# Patient Record
Sex: FEMALE | NOT HISPANIC OR LATINO
[De-identification: names, ages, dates, MRNs, and addresses within clinical notes are randomized per-mention and may not be internally consistent; named-entity substitution may affect disease eponyms.]

---

## 2017-01-23 ENCOUNTER — APPOINTMENT (OUTPATIENT)
Dept: OBGYN | Facility: CLINIC | Age: 37
End: 2017-01-23

## 2017-01-23 VITALS
SYSTOLIC BLOOD PRESSURE: 116 MMHG | DIASTOLIC BLOOD PRESSURE: 78 MMHG | BODY MASS INDEX: 20.41 KG/M2 | WEIGHT: 124 LBS | HEIGHT: 65.5 IN

## 2017-01-23 LAB
FETAL HEART DESCRIPTION: NORMAL
FETAL MOVEMENT: PRESENT
OB COMMENTS: NORMAL

## 2017-02-13 ENCOUNTER — APPOINTMENT (OUTPATIENT)
Dept: OBGYN | Facility: CLINIC | Age: 37
End: 2017-02-13

## 2017-02-13 VITALS
WEIGHT: 124 LBS | HEIGHT: 65.5 IN | DIASTOLIC BLOOD PRESSURE: 76 MMHG | BODY MASS INDEX: 20.41 KG/M2 | SYSTOLIC BLOOD PRESSURE: 123 MMHG

## 2017-02-13 LAB — FETAL HEART DESCRIPTION: NORMAL

## 2017-02-14 ENCOUNTER — ASOB RESULT (OUTPATIENT)
Age: 37
End: 2017-02-14

## 2017-02-14 ENCOUNTER — APPOINTMENT (OUTPATIENT)
Dept: ANTEPARTUM | Facility: CLINIC | Age: 37
End: 2017-02-14

## 2017-02-14 LAB
BASOPHILS # BLD AUTO: 0.02 K/UL
BASOPHILS NFR BLD AUTO: 0.3 %
EOSINOPHIL # BLD AUTO: 0.08 K/UL
EOSINOPHIL NFR BLD AUTO: 1 %
GLUCOSE 1H P 50 G GLC PO SERPL-MCNC: 89 MG/DL
HCT VFR BLD CALC: 35.2 %
HGB BLD-MCNC: 11.5 G/DL
HIV1+2 AB SPEC QL IA.RAPID: NONREACTIVE
IMM GRANULOCYTES NFR BLD AUTO: 0.5 %
LYMPHOCYTES # BLD AUTO: 2.08 K/UL
LYMPHOCYTES NFR BLD AUTO: 27.2 %
MAN DIFF?: NORMAL
MCHC RBC-ENTMCNC: 30.6 PG
MCHC RBC-ENTMCNC: 32.7 GM/DL
MCV RBC AUTO: 93.6 FL
MONOCYTES # BLD AUTO: 0.35 K/UL
MONOCYTES NFR BLD AUTO: 4.6 %
NEUTROPHILS # BLD AUTO: 5.08 K/UL
NEUTROPHILS NFR BLD AUTO: 66.4 %
PLATELET # BLD AUTO: 201 K/UL
RBC # BLD: 3.76 M/UL
RBC # FLD: 12.7 %
WBC # FLD AUTO: 7.65 K/UL

## 2017-03-06 ENCOUNTER — APPOINTMENT (OUTPATIENT)
Dept: OBGYN | Facility: CLINIC | Age: 37
End: 2017-03-06

## 2017-03-06 VITALS
HEIGHT: 65.5 IN | SYSTOLIC BLOOD PRESSURE: 106 MMHG | DIASTOLIC BLOOD PRESSURE: 71 MMHG | BODY MASS INDEX: 20.34 KG/M2 | WEIGHT: 123.6 LBS

## 2017-03-06 LAB
FETAL HEART DESCRIPTION: NORMAL
FETAL MOVEMENT: PRESENT

## 2017-03-21 ENCOUNTER — APPOINTMENT (OUTPATIENT)
Dept: OBGYN | Facility: CLINIC | Age: 37
End: 2017-03-21

## 2017-03-21 VITALS
HEIGHT: 65.5 IN | SYSTOLIC BLOOD PRESSURE: 116 MMHG | WEIGHT: 124 LBS | DIASTOLIC BLOOD PRESSURE: 72 MMHG | BODY MASS INDEX: 20.41 KG/M2

## 2017-03-21 LAB
FETAL HEART DESCRIPTION: NORMAL
FETAL MOVEMENT: PRESENT

## 2017-03-22 ENCOUNTER — APPOINTMENT (OUTPATIENT)
Dept: ANTEPARTUM | Facility: CLINIC | Age: 37
End: 2017-03-22

## 2017-03-22 ENCOUNTER — ASOB RESULT (OUTPATIENT)
Age: 37
End: 2017-03-22

## 2017-04-04 ENCOUNTER — APPOINTMENT (OUTPATIENT)
Dept: OBGYN | Facility: CLINIC | Age: 37
End: 2017-04-04

## 2017-04-04 VITALS
BODY MASS INDEX: 20.91 KG/M2 | HEIGHT: 65.5 IN | DIASTOLIC BLOOD PRESSURE: 60 MMHG | WEIGHT: 127 LBS | SYSTOLIC BLOOD PRESSURE: 90 MMHG

## 2017-04-04 LAB
FETAL HEART DESCRIPTION: NORMAL
FETAL MOVEMENT: PRESENT
FETAL PRESENTATION: NORMAL

## 2017-04-17 ENCOUNTER — APPOINTMENT (OUTPATIENT)
Dept: OBGYN | Facility: CLINIC | Age: 37
End: 2017-04-17

## 2017-04-17 VITALS
HEIGHT: 65.5 IN | WEIGHT: 127 LBS | SYSTOLIC BLOOD PRESSURE: 109 MMHG | BODY MASS INDEX: 20.91 KG/M2 | DIASTOLIC BLOOD PRESSURE: 76 MMHG

## 2017-04-17 LAB
FETAL HEART DESCRIPTION: NORMAL
FETAL MOVEMENT: PRESENT
FETAL PRESENTATION: NORMAL
OB COMMENTS: NORMAL

## 2017-04-19 LAB
GP B STREP DNA SPEC QL NAA+PROBE: NORMAL
GP B STREP DNA SPEC QL NAA+PROBE: NOT DETECTED
SOURCE GBS: NORMAL

## 2017-04-24 ENCOUNTER — APPOINTMENT (OUTPATIENT)
Dept: OBGYN | Facility: CLINIC | Age: 37
End: 2017-04-24

## 2017-04-24 VITALS — DIASTOLIC BLOOD PRESSURE: 68 MMHG | BODY MASS INDEX: 20.81 KG/M2 | WEIGHT: 127 LBS | SYSTOLIC BLOOD PRESSURE: 100 MMHG

## 2017-04-24 LAB
FETAL HEART DESCRIPTION: NORMAL
FETAL MOVEMENT: PRESENT
FETAL PRESENTATION: NORMAL

## 2017-05-01 ENCOUNTER — APPOINTMENT (OUTPATIENT)
Dept: ANTEPARTUM | Facility: CLINIC | Age: 37
End: 2017-05-01

## 2017-05-01 ENCOUNTER — APPOINTMENT (OUTPATIENT)
Dept: OBGYN | Facility: CLINIC | Age: 37
End: 2017-05-01

## 2017-05-01 ENCOUNTER — ASOB RESULT (OUTPATIENT)
Age: 37
End: 2017-05-01

## 2017-05-01 VITALS — DIASTOLIC BLOOD PRESSURE: 80 MMHG | BODY MASS INDEX: 20.65 KG/M2 | SYSTOLIC BLOOD PRESSURE: 102 MMHG | WEIGHT: 126 LBS

## 2017-05-04 LAB
CREAT SPEC-SCNC: 126 MG/DL
CREAT/PROT UR: 0.1 RATIO
PROT UR-MCNC: 13 MG/DL

## 2017-05-08 ENCOUNTER — APPOINTMENT (OUTPATIENT)
Dept: OBGYN | Facility: CLINIC | Age: 37
End: 2017-05-08

## 2017-05-08 VITALS
BODY MASS INDEX: 21.07 KG/M2 | SYSTOLIC BLOOD PRESSURE: 114 MMHG | DIASTOLIC BLOOD PRESSURE: 74 MMHG | WEIGHT: 128 LBS | HEIGHT: 65.5 IN

## 2017-05-08 LAB
FETAL HEART DESCRIPTION: NORMAL
FETAL MOVEMENT: PRESENT
FETAL PRESENTATION: NORMAL

## 2017-05-10 ENCOUNTER — OUTPATIENT (OUTPATIENT)
Dept: OUTPATIENT SERVICES | Facility: HOSPITAL | Age: 37
LOS: 1 days | End: 2017-05-10
Payer: COMMERCIAL

## 2017-05-10 DIAGNOSIS — O47.1 FALSE LABOR AT OR AFTER 37 COMPLETED WEEKS OF GESTATION: ICD-10-CM

## 2017-05-10 LAB
BLD GP AB SCN SERPL QL: NEGATIVE — SIGNIFICANT CHANGE UP
HCT VFR BLD CALC: 32.9 % — LOW (ref 34.5–45)
HGB BLD-MCNC: 11.2 G/DL — LOW (ref 11.5–15.5)
MCHC RBC-ENTMCNC: 29.9 PG — SIGNIFICANT CHANGE UP (ref 27–34)
MCHC RBC-ENTMCNC: 34 GM/DL — SIGNIFICANT CHANGE UP (ref 32–36)
MCV RBC AUTO: 87.7 FL — SIGNIFICANT CHANGE UP (ref 80–100)
PLATELET # BLD AUTO: 172 K/UL — SIGNIFICANT CHANGE UP (ref 150–400)
RBC # BLD: 3.75 M/UL — LOW (ref 3.8–5.2)
RBC # FLD: 12.9 % — SIGNIFICANT CHANGE UP (ref 10.3–14.5)
RH IG SCN BLD-IMP: POSITIVE — SIGNIFICANT CHANGE UP
WBC # BLD: 5.65 K/UL — SIGNIFICANT CHANGE UP (ref 3.8–10.5)
WBC # FLD AUTO: 5.65 K/UL — SIGNIFICANT CHANGE UP (ref 3.8–10.5)

## 2017-05-10 PROCEDURE — 86901 BLOOD TYPING SEROLOGIC RH(D): CPT

## 2017-05-10 PROCEDURE — 85027 COMPLETE CBC AUTOMATED: CPT

## 2017-05-10 PROCEDURE — 86850 RBC ANTIBODY SCREEN: CPT

## 2017-05-10 PROCEDURE — 86900 BLOOD TYPING SEROLOGIC ABO: CPT

## 2017-05-10 PROCEDURE — 59400 OBSTETRICAL CARE: CPT

## 2017-05-11 ENCOUNTER — INPATIENT (INPATIENT)
Facility: HOSPITAL | Age: 37
LOS: 2 days | Discharge: HOME | End: 2017-05-14
Attending: OBSTETRICS & GYNECOLOGY | Admitting: OBSTETRICS & GYNECOLOGY
Payer: COMMERCIAL

## 2017-05-11 ENCOUNTER — APPOINTMENT (OUTPATIENT)
Dept: OBGYN | Facility: CLINIC | Age: 37
End: 2017-05-11

## 2017-05-11 ENCOUNTER — TRANSCRIPTION ENCOUNTER (OUTPATIENT)
Age: 37
End: 2017-05-11

## 2017-05-11 VITALS — HEIGHT: 65 IN | WEIGHT: 125.66 LBS

## 2017-05-11 DIAGNOSIS — O09.523 SUPERVISION OF ELDERLY MULTIGRAVIDA, THIRD TRIMESTER: ICD-10-CM

## 2017-05-11 DIAGNOSIS — O34.21 MATERNAL CARE FOR SCAR FROM PREVIOUS CESAREAN DELIVERY: ICD-10-CM

## 2017-05-11 DIAGNOSIS — Z34.93 ENCOUNTER FOR SUPERVISION OF NORMAL PREGNANCY, UNSPECIFIED, THIRD TRIMESTER: ICD-10-CM

## 2017-05-11 DIAGNOSIS — O34.212 MATERNAL CARE FOR VERTICAL SCAR FROM PREVIOUS CESAREAN DELIVERY: ICD-10-CM

## 2017-05-11 DIAGNOSIS — Z34.90 ENCOUNTER FOR SUPERVISION OF NORMAL PREGNANCY, UNSPECIFIED, UNSPECIFIED TRIMESTER: ICD-10-CM

## 2017-05-11 LAB — T PALLIDUM AB TITR SER: NEGATIVE — SIGNIFICANT CHANGE UP

## 2017-05-11 RX ORDER — KETOROLAC TROMETHAMINE 30 MG/ML
30 SYRINGE (ML) INJECTION EVERY 6 HOURS
Qty: 0 | Refills: 0 | Status: DISCONTINUED | OUTPATIENT
Start: 2017-05-11 | End: 2017-05-13

## 2017-05-11 RX ORDER — OXYTOCIN 10 UNIT/ML
41.67 VIAL (ML) INJECTION
Qty: 20 | Refills: 0 | Status: DISCONTINUED | OUTPATIENT
Start: 2017-05-11 | End: 2017-05-11

## 2017-05-11 RX ORDER — ONDANSETRON 8 MG/1
4 TABLET, FILM COATED ORAL EVERY 6 HOURS
Qty: 0 | Refills: 0 | Status: DISCONTINUED | OUTPATIENT
Start: 2017-05-11 | End: 2017-05-13

## 2017-05-11 RX ORDER — DEXAMETHASONE 0.5 MG/5ML
4 ELIXIR ORAL EVERY 6 HOURS
Qty: 0 | Refills: 0 | Status: DISCONTINUED | OUTPATIENT
Start: 2017-05-11 | End: 2017-05-13

## 2017-05-11 RX ORDER — LANOLIN
1 OINTMENT (GRAM) TOPICAL
Qty: 0 | Refills: 0 | Status: DISCONTINUED | OUTPATIENT
Start: 2017-05-11 | End: 2017-05-14

## 2017-05-11 RX ORDER — SODIUM CHLORIDE 9 MG/ML
1000 INJECTION, SOLUTION INTRAVENOUS
Qty: 0 | Refills: 0 | Status: DISCONTINUED | OUTPATIENT
Start: 2017-05-11 | End: 2017-05-11

## 2017-05-11 RX ORDER — SODIUM CHLORIDE 9 MG/ML
1000 INJECTION, SOLUTION INTRAVENOUS
Qty: 0 | Refills: 0 | Status: DISCONTINUED | OUTPATIENT
Start: 2017-05-11 | End: 2017-05-14

## 2017-05-11 RX ORDER — FERROUS SULFATE 325(65) MG
325 TABLET ORAL DAILY
Qty: 0 | Refills: 0 | Status: DISCONTINUED | OUTPATIENT
Start: 2017-05-11 | End: 2017-05-14

## 2017-05-11 RX ORDER — HEPARIN SODIUM 5000 [USP'U]/ML
5000 INJECTION INTRAVENOUS; SUBCUTANEOUS EVERY 12 HOURS
Qty: 0 | Refills: 0 | Status: DISCONTINUED | OUTPATIENT
Start: 2017-05-11 | End: 2017-05-14

## 2017-05-11 RX ORDER — OXYTOCIN 10 UNIT/ML
333.33 VIAL (ML) INJECTION
Qty: 20 | Refills: 0 | Status: DISCONTINUED | OUTPATIENT
Start: 2017-05-11 | End: 2017-05-14

## 2017-05-11 RX ORDER — FAMOTIDINE 10 MG/ML
20 INJECTION INTRAVENOUS ONCE
Qty: 0 | Refills: 0 | Status: COMPLETED | OUTPATIENT
Start: 2017-05-11 | End: 2017-05-11

## 2017-05-11 RX ORDER — ACETAMINOPHEN 500 MG
975 TABLET ORAL EVERY 6 HOURS
Qty: 0 | Refills: 0 | Status: DISCONTINUED | OUTPATIENT
Start: 2017-05-11 | End: 2017-05-14

## 2017-05-11 RX ORDER — NALOXONE HYDROCHLORIDE 4 MG/.1ML
0.1 SPRAY NASAL
Qty: 0 | Refills: 0 | Status: DISCONTINUED | OUTPATIENT
Start: 2017-05-11 | End: 2017-05-13

## 2017-05-11 RX ORDER — CITRIC ACID/SODIUM CITRATE 300-500 MG
15 SOLUTION, ORAL ORAL ONCE
Qty: 0 | Refills: 0 | Status: COMPLETED | OUTPATIENT
Start: 2017-05-11 | End: 2017-05-11

## 2017-05-11 RX ORDER — OXYCODONE HYDROCHLORIDE 5 MG/1
5 TABLET ORAL
Qty: 0 | Refills: 0 | Status: DISCONTINUED | OUTPATIENT
Start: 2017-05-11 | End: 2017-05-14

## 2017-05-11 RX ORDER — DIPHENHYDRAMINE HCL 50 MG
25 CAPSULE ORAL EVERY 6 HOURS
Qty: 0 | Refills: 0 | Status: DISCONTINUED | OUTPATIENT
Start: 2017-05-11 | End: 2017-05-14

## 2017-05-11 RX ORDER — METOCLOPRAMIDE HCL 10 MG
10 TABLET ORAL ONCE
Qty: 0 | Refills: 0 | Status: DISCONTINUED | OUTPATIENT
Start: 2017-05-11 | End: 2017-05-11

## 2017-05-11 RX ORDER — IBUPROFEN 200 MG
600 TABLET ORAL EVERY 6 HOURS
Qty: 0 | Refills: 0 | Status: DISCONTINUED | OUTPATIENT
Start: 2017-05-11 | End: 2017-05-14

## 2017-05-11 RX ORDER — OXYTOCIN 10 UNIT/ML
41.67 VIAL (ML) INJECTION
Qty: 20 | Refills: 0 | Status: DISCONTINUED | OUTPATIENT
Start: 2017-05-11 | End: 2017-05-14

## 2017-05-11 RX ORDER — SODIUM CHLORIDE 9 MG/ML
1000 INJECTION, SOLUTION INTRAVENOUS ONCE
Qty: 0 | Refills: 0 | Status: COMPLETED | OUTPATIENT
Start: 2017-05-11 | End: 2017-05-11

## 2017-05-11 RX ORDER — DOCUSATE SODIUM 100 MG
100 CAPSULE ORAL
Qty: 0 | Refills: 0 | Status: DISCONTINUED | OUTPATIENT
Start: 2017-05-11 | End: 2017-05-14

## 2017-05-11 RX ORDER — GLYCERIN ADULT
1 SUPPOSITORY, RECTAL RECTAL AT BEDTIME
Qty: 0 | Refills: 0 | Status: DISCONTINUED | OUTPATIENT
Start: 2017-05-11 | End: 2017-05-14

## 2017-05-11 RX ORDER — INFLUENZA VIRUS VACCINE 15; 15; 15; 15 UG/.5ML; UG/.5ML; UG/.5ML; UG/.5ML
0.5 SUSPENSION INTRAMUSCULAR ONCE
Qty: 0 | Refills: 0 | Status: DISCONTINUED | OUTPATIENT
Start: 2017-05-11 | End: 2017-05-14

## 2017-05-11 RX ORDER — SIMETHICONE 80 MG/1
80 TABLET, CHEWABLE ORAL EVERY 4 HOURS
Qty: 0 | Refills: 0 | Status: DISCONTINUED | OUTPATIENT
Start: 2017-05-11 | End: 2017-05-14

## 2017-05-11 RX ORDER — TETANUS TOXOID, REDUCED DIPHTHERIA TOXOID AND ACELLULAR PERTUSSIS VACCINE, ADSORBED 5; 2.5; 8; 8; 2.5 [IU]/.5ML; [IU]/.5ML; UG/.5ML; UG/.5ML; UG/.5ML
0.5 SUSPENSION INTRAMUSCULAR ONCE
Qty: 0 | Refills: 0 | Status: DISCONTINUED | OUTPATIENT
Start: 2017-05-11 | End: 2017-05-14

## 2017-05-11 RX ORDER — OXYCODONE HYDROCHLORIDE 5 MG/1
5 TABLET ORAL EVERY 4 HOURS
Qty: 0 | Refills: 0 | Status: DISCONTINUED | OUTPATIENT
Start: 2017-05-11 | End: 2017-05-14

## 2017-05-11 RX ADMIN — SIMETHICONE 80 MILLIGRAM(S): 80 TABLET, CHEWABLE ORAL at 22:12

## 2017-05-11 RX ADMIN — Medication 30 MILLIGRAM(S): at 22:12

## 2017-05-11 RX ADMIN — Medication 15 MILLILITER(S): at 11:07

## 2017-05-11 RX ADMIN — FAMOTIDINE 20 MILLIGRAM(S): 10 INJECTION INTRAVENOUS at 11:42

## 2017-05-11 RX ADMIN — Medication 30 MILLIGRAM(S): at 23:00

## 2017-05-11 RX ADMIN — ONDANSETRON 4 MILLIGRAM(S): 8 TABLET, FILM COATED ORAL at 22:23

## 2017-05-11 RX ADMIN — Medication 25 MILLIGRAM(S): at 23:12

## 2017-05-11 RX ADMIN — SODIUM CHLORIDE 125 MILLILITER(S): 9 INJECTION, SOLUTION INTRAVENOUS at 17:46

## 2017-05-11 RX ADMIN — SODIUM CHLORIDE 2000 MILLILITER(S): 9 INJECTION, SOLUTION INTRAVENOUS at 10:30

## 2017-05-12 LAB
BASOPHILS # BLD AUTO: 0 K/UL — SIGNIFICANT CHANGE UP (ref 0–0.2)
BASOPHILS NFR BLD AUTO: 0.4 % — SIGNIFICANT CHANGE UP (ref 0–2)
EOSINOPHIL # BLD AUTO: 0.1 K/UL — SIGNIFICANT CHANGE UP (ref 0–0.5)
EOSINOPHIL NFR BLD AUTO: 1.4 % — SIGNIFICANT CHANGE UP (ref 0–6)
HCT VFR BLD CALC: 27.4 % — LOW (ref 34.5–45)
HGB BLD-MCNC: 9.1 G/DL — LOW (ref 11.5–15.5)
LYMPHOCYTES # BLD AUTO: 1.9 K/UL — SIGNIFICANT CHANGE UP (ref 1–3.3)
LYMPHOCYTES # BLD AUTO: 24.3 % — SIGNIFICANT CHANGE UP (ref 13–44)
MCHC RBC-ENTMCNC: 29.9 PG — SIGNIFICANT CHANGE UP (ref 27–34)
MCHC RBC-ENTMCNC: 33.3 GM/DL — SIGNIFICANT CHANGE UP (ref 32–36)
MCV RBC AUTO: 89.7 FL — SIGNIFICANT CHANGE UP (ref 80–100)
MONOCYTES # BLD AUTO: 0.3 K/UL — SIGNIFICANT CHANGE UP (ref 0–0.9)
MONOCYTES NFR BLD AUTO: 4.3 % — SIGNIFICANT CHANGE UP (ref 2–14)
NEUTROPHILS # BLD AUTO: 5.3 K/UL — SIGNIFICANT CHANGE UP (ref 1.8–7.4)
NEUTROPHILS NFR BLD AUTO: 69.6 % — SIGNIFICANT CHANGE UP (ref 43–77)
PLATELET # BLD AUTO: 144 K/UL — LOW (ref 150–400)
RBC # BLD: 3.06 M/UL — LOW (ref 3.8–5.2)
RBC # FLD: 11.6 % — SIGNIFICANT CHANGE UP (ref 10.3–14.5)
WBC # BLD: 7.7 K/UL — SIGNIFICANT CHANGE UP (ref 3.8–10.5)
WBC # FLD AUTO: 7.7 K/UL — SIGNIFICANT CHANGE UP (ref 3.8–10.5)

## 2017-05-12 RX ADMIN — Medication 1 TABLET(S): at 11:46

## 2017-05-12 RX ADMIN — Medication 975 MILLIGRAM(S): at 06:33

## 2017-05-12 RX ADMIN — SIMETHICONE 80 MILLIGRAM(S): 80 TABLET, CHEWABLE ORAL at 06:39

## 2017-05-12 RX ADMIN — SIMETHICONE 80 MILLIGRAM(S): 80 TABLET, CHEWABLE ORAL at 09:53

## 2017-05-12 RX ADMIN — Medication 100 MILLIGRAM(S): at 11:49

## 2017-05-12 RX ADMIN — ONDANSETRON 4 MILLIGRAM(S): 8 TABLET, FILM COATED ORAL at 19:05

## 2017-05-12 RX ADMIN — Medication 975 MILLIGRAM(S): at 17:40

## 2017-05-12 RX ADMIN — SIMETHICONE 80 MILLIGRAM(S): 80 TABLET, CHEWABLE ORAL at 01:54

## 2017-05-12 RX ADMIN — Medication 30 MILLIGRAM(S): at 04:30

## 2017-05-12 RX ADMIN — Medication 30 MILLIGRAM(S): at 10:30

## 2017-05-12 RX ADMIN — Medication 30 MILLIGRAM(S): at 18:10

## 2017-05-12 RX ADMIN — Medication 30 MILLIGRAM(S): at 09:56

## 2017-05-12 RX ADMIN — Medication 30 MILLIGRAM(S): at 17:40

## 2017-05-12 RX ADMIN — SIMETHICONE 80 MILLIGRAM(S): 80 TABLET, CHEWABLE ORAL at 17:40

## 2017-05-12 RX ADMIN — Medication 100 MILLIGRAM(S): at 06:32

## 2017-05-12 RX ADMIN — Medication 25 MILLIGRAM(S): at 21:43

## 2017-05-12 RX ADMIN — ONDANSETRON 4 MILLIGRAM(S): 8 TABLET, FILM COATED ORAL at 08:02

## 2017-05-12 RX ADMIN — Medication 975 MILLIGRAM(S): at 11:49

## 2017-05-12 NOTE — LACTATION INITIAL EVALUATION - PRO BREASTFEED PREV EXP YN OB
yes/"Tried" for 4 minths with her second who is 4 years old but had issues and supplemented with formula.

## 2017-05-13 ENCOUNTER — TRANSCRIPTION ENCOUNTER (OUTPATIENT)
Age: 37
End: 2017-05-13

## 2017-05-13 RX ADMIN — OXYCODONE HYDROCHLORIDE 5 MILLIGRAM(S): 5 TABLET ORAL at 09:38

## 2017-05-13 RX ADMIN — OXYCODONE HYDROCHLORIDE 5 MILLIGRAM(S): 5 TABLET ORAL at 09:04

## 2017-05-13 RX ADMIN — Medication 30 MILLIGRAM(S): at 01:00

## 2017-05-13 RX ADMIN — Medication 600 MILLIGRAM(S): at 12:45

## 2017-05-13 RX ADMIN — SIMETHICONE 80 MILLIGRAM(S): 80 TABLET, CHEWABLE ORAL at 06:50

## 2017-05-13 RX ADMIN — OXYCODONE HYDROCHLORIDE 5 MILLIGRAM(S): 5 TABLET ORAL at 21:30

## 2017-05-13 RX ADMIN — Medication 100 MILLIGRAM(S): at 09:05

## 2017-05-13 RX ADMIN — Medication 975 MILLIGRAM(S): at 11:52

## 2017-05-13 RX ADMIN — Medication 975 MILLIGRAM(S): at 17:45

## 2017-05-13 RX ADMIN — Medication 325 MILLIGRAM(S): at 11:52

## 2017-05-13 RX ADMIN — OXYCODONE HYDROCHLORIDE 5 MILLIGRAM(S): 5 TABLET ORAL at 12:45

## 2017-05-13 RX ADMIN — ONDANSETRON 4 MILLIGRAM(S): 8 TABLET, FILM COATED ORAL at 01:33

## 2017-05-13 RX ADMIN — Medication 975 MILLIGRAM(S): at 06:51

## 2017-05-13 RX ADMIN — Medication 600 MILLIGRAM(S): at 18:31

## 2017-05-13 RX ADMIN — OXYCODONE HYDROCHLORIDE 5 MILLIGRAM(S): 5 TABLET ORAL at 11:51

## 2017-05-13 RX ADMIN — Medication 30 MILLIGRAM(S): at 07:08

## 2017-05-13 RX ADMIN — OXYCODONE HYDROCHLORIDE 5 MILLIGRAM(S): 5 TABLET ORAL at 14:59

## 2017-05-13 RX ADMIN — Medication 100 MILLIGRAM(S): at 21:29

## 2017-05-13 RX ADMIN — Medication 1 TABLET(S): at 11:51

## 2017-05-13 RX ADMIN — Medication 30 MILLIGRAM(S): at 00:43

## 2017-05-13 RX ADMIN — OXYCODONE HYDROCHLORIDE 5 MILLIGRAM(S): 5 TABLET ORAL at 22:00

## 2017-05-13 RX ADMIN — OXYCODONE HYDROCHLORIDE 5 MILLIGRAM(S): 5 TABLET ORAL at 18:31

## 2017-05-13 RX ADMIN — SIMETHICONE 80 MILLIGRAM(S): 80 TABLET, CHEWABLE ORAL at 21:52

## 2017-05-13 RX ADMIN — OXYCODONE HYDROCHLORIDE 5 MILLIGRAM(S): 5 TABLET ORAL at 17:44

## 2017-05-13 RX ADMIN — SIMETHICONE 80 MILLIGRAM(S): 80 TABLET, CHEWABLE ORAL at 00:43

## 2017-05-13 RX ADMIN — Medication 600 MILLIGRAM(S): at 11:51

## 2017-05-13 RX ADMIN — OXYCODONE HYDROCHLORIDE 5 MILLIGRAM(S): 5 TABLET ORAL at 15:34

## 2017-05-13 RX ADMIN — Medication 30 MILLIGRAM(S): at 06:51

## 2017-05-13 RX ADMIN — Medication 600 MILLIGRAM(S): at 17:45

## 2017-05-13 NOTE — DISCHARGE NOTE OB - CARE PROVIDER_API CALL
Guerita Schuler), Obstetrics and Gynecology  [address] [place] [address]  Phone: [phone]  Fax: [phone]

## 2017-05-13 NOTE — DISCHARGE NOTE OB - HOSPITAL COURSE
Repeat , POD # 3 stable for discharge home--meeting all milestone.   No post-op heparin Repeat , POD # 3 stable for discharge home--meeting all milestone.   No post-op heparin  Return to office in 2 weeks

## 2017-05-13 NOTE — DISCHARGE NOTE OB - PLAN OF CARE
home Return to office in 2 weeks, call if fever, chill, heavy vaginal bleeding or drainage from incision  or pain. Return to office in 2 weeks, call if fever, chill, severe headache, chest pain,  heavy vaginal bleeding or drainage from incision  or pain.

## 2017-05-13 NOTE — DISCHARGE NOTE OB - CARE PROVIDERS DIRECT ADDRESSES
[email or web address]

## 2017-05-13 NOTE — DISCHARGE NOTE OB - MEDICATION SUMMARY - MEDICATIONS TO TAKE
I will START or STAY ON the medications listed below when I get home from the hospital:    ibuprofen 600 mg oral tablet  -- 1 tab(s) by mouth every 6 hours  -- Indication: For S/P     oxycodone-acetaminophen 5mg-325mg oral tablet  -- 1 tab(s) by mouth 4 times a day MDD:20mg  -- Caution federal law prohibits the transfer of this drug to any person other  than the person for whom it was prescribed.  May cause drowsiness.  Alcohol may intensify this effect.  Use care when operating dangerous machinery.  This prescription cannot be refilled.  This product contains acetaminophen.  Do not use  with any other product containing acetaminophen to prevent possible liver damage.  Using more of this medication than prescribed may cause serious breathing problems.    -- Indication: For S/P     Prenatal 1 oral capsule  --  by mouth   -- Indication: For Encounter for maternal care for vertical scar from previous  delivery    docusate sodium 100 mg oral capsule  -- 1 cap(s) by mouth 3 times a day, As needed, Stool Softening  -- Indication: For S/P     simethicone 80 mg oral tablet, chewable  -- 1 tab(s) by mouth every 4 hours, As needed, Gas  -- Indication: For S/P     ascorbic acid 250 mg oral tablet  -- 1 tab(s) by mouth 3 times a day  -- Indication: For S/P

## 2017-05-13 NOTE — PROVIDER CONTACT NOTE (OTHER) - ASSESSMENT
Patient ambulating in room and hallway.  Aware of risks of not taking heparin.  Encouraged to use sequential compression stockings when in bed.

## 2017-05-13 NOTE — DISCHARGE NOTE OB - PATIENT PORTAL LINK FT
“You can access the FollowHealth Patient Portal, offered by [facility], by registering with the following website: [email or web address]”

## 2017-05-13 NOTE — DISCHARGE NOTE OB - CARE PLAN
Principal Discharge DX:	S/P   Goal:	home  Instructions for follow-up, activity and diet:	Return to office in 2 weeks, call if fever, chill, heavy vaginal bleeding or drainage from incision  or pain. Principal Discharge DX:	S/P   Goal:	home  Instructions for follow-up, activity and diet:	Return to office in 2 weeks, call if fever, chill, severe headache, chest pain,  heavy vaginal bleeding or drainage from incision  or pain.

## 2017-05-14 VITALS
TEMPERATURE: 97 F | HEART RATE: 56 BPM | RESPIRATION RATE: 18 BRPM | DIASTOLIC BLOOD PRESSURE: 79 MMHG | OXYGEN SATURATION: 98 % | SYSTOLIC BLOOD PRESSURE: 125 MMHG

## 2017-05-14 LAB
BASOPHILS # BLD AUTO: 0.1 K/UL — SIGNIFICANT CHANGE UP (ref 0–0.2)
BASOPHILS NFR BLD AUTO: 1 % — SIGNIFICANT CHANGE UP (ref 0–2)
EOSINOPHIL # BLD AUTO: 0.2 K/UL — SIGNIFICANT CHANGE UP (ref 0–0.5)
EOSINOPHIL NFR BLD AUTO: 2.5 % — SIGNIFICANT CHANGE UP (ref 0–6)
HCT VFR BLD CALC: 28.5 % — LOW (ref 34.5–45)
HGB BLD-MCNC: 9.5 G/DL — LOW (ref 11.5–15.5)
LYMPHOCYTES # BLD AUTO: 2.4 K/UL — SIGNIFICANT CHANGE UP (ref 1–3.3)
LYMPHOCYTES # BLD AUTO: 36.5 % — SIGNIFICANT CHANGE UP (ref 13–44)
MCHC RBC-ENTMCNC: 30.1 PG — SIGNIFICANT CHANGE UP (ref 27–34)
MCHC RBC-ENTMCNC: 33.5 GM/DL — SIGNIFICANT CHANGE UP (ref 32–36)
MCV RBC AUTO: 90 FL — SIGNIFICANT CHANGE UP (ref 80–100)
MONOCYTES # BLD AUTO: 0.3 K/UL — SIGNIFICANT CHANGE UP (ref 0–0.9)
MONOCYTES NFR BLD AUTO: 3.9 % — SIGNIFICANT CHANGE UP (ref 2–14)
NEUTROPHILS # BLD AUTO: 3.6 K/UL — SIGNIFICANT CHANGE UP (ref 1.8–7.4)
NEUTROPHILS NFR BLD AUTO: 56.1 % — SIGNIFICANT CHANGE UP (ref 43–77)
PLATELET # BLD AUTO: 193 K/UL — SIGNIFICANT CHANGE UP (ref 150–400)
RBC # BLD: 3.16 M/UL — LOW (ref 3.8–5.2)
RBC # FLD: 11.5 % — SIGNIFICANT CHANGE UP (ref 10.3–14.5)
WBC # BLD: 6.4 K/UL — SIGNIFICANT CHANGE UP (ref 3.8–10.5)
WBC # FLD AUTO: 6.4 K/UL — SIGNIFICANT CHANGE UP (ref 3.8–10.5)

## 2017-05-14 PROCEDURE — 59050 FETAL MONITOR W/REPORT: CPT

## 2017-05-14 PROCEDURE — 59025 FETAL NON-STRESS TEST: CPT

## 2017-05-14 PROCEDURE — 86780 TREPONEMA PALLIDUM: CPT

## 2017-05-14 PROCEDURE — 85027 COMPLETE CBC AUTOMATED: CPT

## 2017-05-14 RX ORDER — ASCORBIC ACID 60 MG
1 TABLET,CHEWABLE ORAL
Qty: 0 | Refills: 0 | COMMUNITY
Start: 2017-05-14

## 2017-05-14 RX ORDER — IBUPROFEN 200 MG
1 TABLET ORAL
Qty: 40 | Refills: 1 | OUTPATIENT
Start: 2017-05-14 | End: 2017-06-02

## 2017-05-14 RX ORDER — DOCUSATE SODIUM 100 MG
100 CAPSULE ORAL THREE TIMES A DAY
Qty: 0 | Refills: 0 | Status: DISCONTINUED | OUTPATIENT
Start: 2017-05-14 | End: 2017-05-14

## 2017-05-14 RX ORDER — FERROUS SULFATE 325(65) MG
325 TABLET ORAL
Qty: 0 | Refills: 0 | Status: DISCONTINUED | OUTPATIENT
Start: 2017-05-14 | End: 2017-05-14

## 2017-05-14 RX ORDER — ASCORBIC ACID 60 MG
250 TABLET,CHEWABLE ORAL THREE TIMES A DAY
Qty: 0 | Refills: 0 | Status: DISCONTINUED | OUTPATIENT
Start: 2017-05-14 | End: 2017-05-14

## 2017-05-14 RX ORDER — DOCUSATE SODIUM 100 MG
1 CAPSULE ORAL
Qty: 0 | Refills: 0 | COMMUNITY
Start: 2017-05-14

## 2017-05-14 RX ORDER — SIMETHICONE 80 MG/1
1 TABLET, CHEWABLE ORAL
Qty: 0 | Refills: 0 | COMMUNITY
Start: 2017-05-14

## 2017-05-14 RX ADMIN — OXYCODONE HYDROCHLORIDE 5 MILLIGRAM(S): 5 TABLET ORAL at 00:21

## 2017-05-14 RX ADMIN — OXYCODONE HYDROCHLORIDE 5 MILLIGRAM(S): 5 TABLET ORAL at 03:33

## 2017-05-14 RX ADMIN — OXYCODONE HYDROCHLORIDE 5 MILLIGRAM(S): 5 TABLET ORAL at 12:30

## 2017-05-14 RX ADMIN — Medication 100 MILLIGRAM(S): at 07:10

## 2017-05-14 RX ADMIN — Medication 600 MILLIGRAM(S): at 07:09

## 2017-05-14 RX ADMIN — OXYCODONE HYDROCHLORIDE 5 MILLIGRAM(S): 5 TABLET ORAL at 01:00

## 2017-05-14 RX ADMIN — Medication 975 MILLIGRAM(S): at 07:10

## 2017-05-14 RX ADMIN — Medication 1 TABLET(S): at 11:57

## 2017-05-14 RX ADMIN — OXYCODONE HYDROCHLORIDE 5 MILLIGRAM(S): 5 TABLET ORAL at 04:00

## 2017-05-14 RX ADMIN — Medication 600 MILLIGRAM(S): at 01:00

## 2017-05-14 RX ADMIN — SIMETHICONE 80 MILLIGRAM(S): 80 TABLET, CHEWABLE ORAL at 07:10

## 2017-05-14 RX ADMIN — Medication 325 MILLIGRAM(S): at 11:57

## 2017-05-14 RX ADMIN — OXYCODONE HYDROCHLORIDE 5 MILLIGRAM(S): 5 TABLET ORAL at 07:10

## 2017-05-14 RX ADMIN — Medication 975 MILLIGRAM(S): at 00:21

## 2017-05-14 RX ADMIN — Medication 600 MILLIGRAM(S): at 00:21

## 2017-05-14 RX ADMIN — OXYCODONE HYDROCHLORIDE 5 MILLIGRAM(S): 5 TABLET ORAL at 11:56

## 2017-05-14 RX ADMIN — SIMETHICONE 80 MILLIGRAM(S): 80 TABLET, CHEWABLE ORAL at 11:57

## 2017-05-14 RX ADMIN — Medication 100 MILLIGRAM(S): at 11:56

## 2017-06-05 ENCOUNTER — APPOINTMENT (OUTPATIENT)
Dept: OBGYN | Facility: CLINIC | Age: 37
End: 2017-06-05

## 2017-06-05 VITALS
WEIGHT: 119 LBS | HEIGHT: 65.5 IN | BODY MASS INDEX: 19.59 KG/M2 | DIASTOLIC BLOOD PRESSURE: 70 MMHG | SYSTOLIC BLOOD PRESSURE: 120 MMHG

## 2017-06-05 DIAGNOSIS — Z98.891 HISTORY OF UTERINE SCAR FROM PREVIOUS SURGERY: ICD-10-CM

## 2017-07-17 ENCOUNTER — APPOINTMENT (OUTPATIENT)
Dept: OBGYN | Facility: CLINIC | Age: 37
End: 2017-07-17

## 2017-11-13 ENCOUNTER — APPOINTMENT (OUTPATIENT)
Dept: INTERNAL MEDICINE | Facility: CLINIC | Age: 37
End: 2017-11-13
Payer: COMMERCIAL

## 2017-11-13 DIAGNOSIS — Z23 ENCOUNTER FOR IMMUNIZATION: ICD-10-CM

## 2017-11-13 PROCEDURE — G0008: CPT

## 2017-11-13 PROCEDURE — 90686 IIV4 VACC NO PRSV 0.5 ML IM: CPT

## 2017-12-19 ENCOUNTER — APPOINTMENT (OUTPATIENT)
Dept: OBGYN | Facility: CLINIC | Age: 37
End: 2017-12-19
Payer: COMMERCIAL

## 2017-12-19 VITALS
WEIGHT: 119 LBS | SYSTOLIC BLOOD PRESSURE: 119 MMHG | DIASTOLIC BLOOD PRESSURE: 86 MMHG | HEIGHT: 65.5 IN | BODY MASS INDEX: 19.59 KG/M2

## 2017-12-19 DIAGNOSIS — Z87.42 PERSONAL HISTORY OF OTHER DISEASES OF THE FEMALE GENITAL TRACT: ICD-10-CM

## 2017-12-19 PROCEDURE — 99395 PREV VISIT EST AGE 18-39: CPT

## 2017-12-20 LAB — HPV HIGH+LOW RISK DNA PNL CVX: NOT DETECTED

## 2017-12-27 LAB — CYTOLOGY CVX/VAG DOC THIN PREP: NORMAL

## 2018-01-08 ENCOUNTER — RX RENEWAL (OUTPATIENT)
Age: 38
End: 2018-01-08

## 2018-04-20 ENCOUNTER — OTHER (OUTPATIENT)
Age: 38
End: 2018-04-20

## 2018-10-15 ENCOUNTER — APPOINTMENT (OUTPATIENT)
Dept: INTERNAL MEDICINE | Facility: CLINIC | Age: 38
End: 2018-10-15
Payer: COMMERCIAL

## 2018-10-15 PROCEDURE — 90686 IIV4 VACC NO PRSV 0.5 ML IM: CPT

## 2018-10-15 PROCEDURE — G0008: CPT

## 2019-02-03 PROBLEM — O09.523 ELDERLY MULTIGRAVIDA IN THIRD TRIMESTER: Status: RESOLVED | Noted: 2017-05-01 | Resolved: 2019-02-03

## 2019-03-05 ENCOUNTER — APPOINTMENT (OUTPATIENT)
Dept: OBGYN | Facility: CLINIC | Age: 39
End: 2019-03-05
Payer: COMMERCIAL

## 2019-03-05 VITALS
DIASTOLIC BLOOD PRESSURE: 60 MMHG | WEIGHT: 119 LBS | SYSTOLIC BLOOD PRESSURE: 90 MMHG | BODY MASS INDEX: 19.59 KG/M2 | HEIGHT: 65.5 IN

## 2019-03-05 DIAGNOSIS — Z87.09 PERSONAL HISTORY OF OTHER DISEASES OF THE RESPIRATORY SYSTEM: ICD-10-CM

## 2019-03-05 PROCEDURE — 99395 PREV VISIT EST AGE 18-39: CPT

## 2019-03-05 RX ORDER — SPIRONOLACTONE 50 MG/1
50 TABLET ORAL
Refills: 0 | Status: DISCONTINUED | COMMUNITY
Start: 2017-12-19 | End: 2019-03-05

## 2019-03-05 NOTE — PHYSICAL EXAM
[Awake] : awake [Alert] : alert [Breast Implant Bilateral] : implants [Soft] : soft [Oriented x3] : oriented to person, place, and time [Normal] : uterus [No Bleeding] : there was no active vaginal bleeding [Uterine Adnexae] : were not tender and not enlarged [Acute Distress] : no acute distress [Mass] : no breast mass [Nipple Discharge] : no nipple discharge [Axillary LAD] : no axillary lymphadenopathy [Tender] : non tender

## 2019-03-05 NOTE — CHIEF COMPLAINT
[Annual Visit] : annual visit [FreeTextEntry1] : no issues\par Currently on Accutane \par is non compliant with OCP \par She is also complaining of having no sex drive

## 2020-05-18 LAB
SARS-COV-2 IGG SERPL IA-ACNC: <3.8 AU/ML
SARS-COV-2 IGG SERPL QL IA: NEGATIVE

## 2020-08-23 ENCOUNTER — EMERGENCY (EMERGENCY)
Facility: HOSPITAL | Age: 40
LOS: 1 days | Discharge: HOME | End: 2020-08-23
Attending: STUDENT IN AN ORGANIZED HEALTH CARE EDUCATION/TRAINING PROGRAM
Payer: COMMERCIAL

## 2020-08-23 VITALS
WEIGHT: 119.93 LBS | HEART RATE: 53 BPM | DIASTOLIC BLOOD PRESSURE: 89 MMHG | TEMPERATURE: 98 F | SYSTOLIC BLOOD PRESSURE: 130 MMHG | RESPIRATION RATE: 18 BRPM | HEIGHT: 65 IN | OXYGEN SATURATION: 98 %

## 2020-08-23 VITALS
HEART RATE: 47 BPM | OXYGEN SATURATION: 100 % | RESPIRATION RATE: 16 BRPM | SYSTOLIC BLOOD PRESSURE: 101 MMHG | DIASTOLIC BLOOD PRESSURE: 81 MMHG | TEMPERATURE: 98 F

## 2020-08-23 DIAGNOSIS — K20.9 ESOPHAGITIS, UNSPECIFIED: ICD-10-CM

## 2020-08-23 PROCEDURE — 31505 DIAGNOSTIC LARYNGOSCOPY: CPT

## 2020-08-23 PROCEDURE — 70360 X-RAY EXAM OF NECK: CPT | Mod: 26

## 2020-08-23 PROCEDURE — 99285 EMERGENCY DEPT VISIT HI MDM: CPT | Mod: 25

## 2020-08-23 PROCEDURE — 99284 EMERGENCY DEPT VISIT MOD MDM: CPT

## 2020-08-23 PROCEDURE — ZZZZZ: CPT

## 2020-08-23 PROCEDURE — 70360 X-RAY EXAM OF NECK: CPT

## 2020-08-23 RX ORDER — FAMOTIDINE 10 MG/ML
20 INJECTION INTRAVENOUS ONCE
Refills: 0 | Status: COMPLETED | OUTPATIENT
Start: 2020-08-23 | End: 2020-08-23

## 2020-08-23 RX ORDER — PANTOPRAZOLE SODIUM 20 MG/1
40 TABLET, DELAYED RELEASE ORAL ONCE
Refills: 0 | Status: COMPLETED | OUTPATIENT
Start: 2020-08-23 | End: 2020-08-23

## 2020-08-23 RX ORDER — LIDOCAINE 4 G/100G
10 CREAM TOPICAL ONCE
Refills: 0 | Status: COMPLETED | OUTPATIENT
Start: 2020-08-23 | End: 2020-08-23

## 2020-08-23 RX ADMIN — PANTOPRAZOLE SODIUM 40 MILLIGRAM(S): 20 TABLET, DELAYED RELEASE ORAL at 21:02

## 2020-08-23 RX ADMIN — LIDOCAINE 10 MILLILITER(S): 4 CREAM TOPICAL at 21:02

## 2020-08-23 RX ADMIN — Medication 30 MILLILITER(S): at 21:02

## 2020-08-23 RX ADMIN — FAMOTIDINE 20 MILLIGRAM(S): 10 INJECTION INTRAVENOUS at 21:02

## 2020-08-23 NOTE — ED ADULT NURSE NOTE - CHPI ED NUR SYMPTOMS NEG
no nausea/no syncope/no weakness/no fever/no bleeding gums/no loss of consciousness/no numbness/no vomiting/no chills

## 2020-08-23 NOTE — ED PROVIDER NOTE - CLINICAL SUMMARY MEDICAL DECISION MAKING FREE TEXT BOX
40F p/w foreign body sensation in throat after swallowing pill last night. able to tolerate liquids but guarded to solids and did not attempt after unable to tolerate breakfast this AM. No posterior pharyngeal swelling. No vomiting. Will obtain soft tissue xray of neck, ENT consult and reassess.

## 2020-08-23 NOTE — CONSULT NOTE ADULT - SUBJECTIVE AND OBJECTIVE BOX
CC: FB sensation    HPI: 40 F no pmhx presenting with feeling of pill stuck in her throat since last night. Patient reports that she has been on doxycycline x10 days for acne, last night she took a pill and laid down immediately after. States that she woke up in the middle of the night feeling like something was stuck in her throat, tried drinking water which was able to go down but continued to feel like it was getting stuck and having pain. States that she tried eating breakfast but felt like it was getting stuck so did not try to eat anything else today. She states that she has been able to drink water without it coming back up but it has been painful. She denies any difficulty breathing. voice change, fevers, cough, hemoptysis.        PAST MEDICAL & SURGICAL HISTORY:  No Past Medical History  S/P :   S/P abdominoplasty  S/P breast augmentation    Allergies    No Known Allergies    Intolerances      MEDICATIONS  (STANDING):    MEDICATIONS  (PRN):  aluminum hydroxide/magnesium hydroxide/simethicone Suspension 30 milliLiter(s) Oral every 4 hours PRN Dyspepsia      Social History: no tobacco use   Family history: no pertinent FHx    ROS:   ENT: all negative except as noted in HPI   CV: denies palpitations  Pulm: denies SOB, cough, hemoptysis  GI: denies change in apetite, indigestion, n/v  : denies pertinent urinary symptoms, urgency  Neuro: denies numbness/tingling, loss of sensation  Psych: denies anxiety  MS: denies muscle weakness, instability  Heme: denies easy bruising or bleeding  Endo: denies heat/cold intolerance, excessive sweating  Vascular: denies LE edema    Vital Signs Last 24 Hrs  T(C): 36.8 (23 Aug 2020 21:21), Max: 36.9 (23 Aug 2020 19:50)  T(F): 98.3 (23 Aug 2020 21:21), Max: 98.5 (23 Aug 2020 19:50)  HR: 47 (23 Aug 2020 21:21) (47 - 53)  BP: 101/81 (23 Aug 2020 21:21) (101/81 - 130/89)  BP(mean): --  RR: 16 (23 Aug 2020 21:21) (16 - 18)  SpO2: 100% (23 Aug 2020 21:21) (98% - 100%)              PHYSICAL EXAM:  Gen: NAD  Skin: No rashes, bruises, or lesions  Head: Normocephalic, Atraumatic  Face: no edema, erythema, or fluctuance. Parotid glands soft without mass  Eyes: no scleral injection  Nose: Nares bilaterally patent, no discharge  Mouth: No Stridor / Drooling / Trismus.  Mucosa moist, tongue/uvula midline, oropharynx clear  Neck: Flat, supple, no lymphadenopathy, trachea midline, no masses  Lymphatic: No lymphadenopathy  Resp: breathing easily, no stridor  CV: no peripheral edema/cyanosis  GI: nondistended   Peripheral vascular: no JVD or edema  Neuro: facial nerve intact, no facial droop          Fiberoptic Indirect laryngoscopy:  (Scope #2 used)    Reason for Laryngoscopy:    Patient was unable to cooperate with mirror.  Nasopharynx, oropharynx, and hypopharynx clear, no bleeding. Tongue base, posterior pharyngeal wall, vallecula, epiglottis, and subglottis appear normal. No erythema, edema, pooling of secretions, masses or lesions. Airway patent, no foreign body visualized. No glottic/supraglottic edema. True vocal cords, arytenoids, vestibular folds, ventricles, pyriform sinuses, and aryepiglottic folds appear normal bilaterally. Vocal cords mobile with good contact b/l.          IMAGING/ADDITIONAL STUDIES:

## 2020-08-23 NOTE — ED PROVIDER NOTE - OBJECTIVE STATEMENT
40 F no pmhx presenting with feeling of pill stuck in her throat since last night. Patient reports that she has been on doxycycline x10 days for acne, last night she took a pill and laid down immediately after. States that she woke up in the middle of the night feeling like something was stuck in her throat, tried drinking water which was able to go down but continued to feel like it was getting stuck and having pain. States that she tried eating breakfast but felt like it was getting stuck so did not try to eat anything else today. She states that she has been able to drink water without it coming back up but it has been painful. She denies any difficulty breathing.

## 2020-08-23 NOTE — ED PROVIDER NOTE - ATTENDING CONTRIBUTION TO CARE
I have personally performed a face to face medical and diagnostic evaluation of the patient. I have discussed with and reviewed the ACP's note and agree with the History, ROS, Physical Exam and MDM unless otherwise indicated. A brief summary of my personal evaluation and impression can be found below.    40F no pmh presents with a cc of feeling of something stuck in back of throat, reports is taking doxycyline for oxy, took a pill yesterday and then laid down, woke up last night feeling as if something stuck in throat. Reports is able to tolerate PO though with difficutly. Feels as if is stuck in throat, feels as if is also having some mild discomfort with breathing but is able to breath comfortably. Denies n/v/f/c/cp/sob. Denies headache, syncope, lightheadedness, dizziness. Denies chest palpitations, abdominal pain. Denies edema. Denies dysuria, hematuria, BRBPR, tarry stools, diarrhea, constipation.     All other ROS negative, except as above and as per HPI and ROS section.    VITALS: Initial triage and subsequent vitals have been reviewed by me.  GEN APPEARANCE: WDWN, alert, non-toxic, NAD  HEAD: Atraumatic.  EYES: PERRLa, EOMI, vision grossly intact.   NECK: Supple  CV: RRR, S1S2, no c/r/m/g. Cap refill <2 seconds. No bruits.   LUNGS: CTAB. No abnormal breath sounds.  ABDOMEN: Soft, NTND. No guarding or rebound.   MSK/EXT: No spinal or extremity point tenderness. No CVA ttp. Pelvis stable. No peripheral edema.  NEURO: Alert, follows commands. Weight bearing normal. Speech normal. Sensation and motor normal x4 extremities.   SKIN: Warm, dry and intact. No rash.  PSYCH: Appropriate    Plan/MDM: 40F presents with cc of sensation suck in back of throat, is able to tolerate PO however, exam vss non toxic handling secretions phonating normally, no obvious distress. Throat clear, will discuss with ENT, xr soft tissue neck, give meds, po chal, likely dc if able to tolerate PO.

## 2020-08-23 NOTE — ED ADULT NURSE REASSESSMENT NOTE - NS ED NURSE REASSESS COMMENT FT1
Patient given ginger ale and water for PO challenge. Taking small sips. States that it is uncomfortable to swallow.

## 2020-08-23 NOTE — ED ADULT TRIAGE NOTE - CHIEF COMPLAINT QUOTE
pt reports swallowing pill last night & then laid down, feels pill in throat today, speaking in complete sentences in triage, O2 sat 100% RA, maintaining secretions

## 2020-08-23 NOTE — ED PROVIDER NOTE - NSFOLLOWUPCLINICS_GEN_ALL_ED_FT
[facility] Gastroenterology  Gastroenterology  [address] [place] [address] [address]  [place] [address]  Phone: [phone]  Fax:     [facility] - ENT  Otolaryngology (ENT)  [address] [place] [address]  Phone: [phone]  Fax:   Follow Up Time:

## 2020-08-23 NOTE — ED ADULT NURSE NOTE - OBJECTIVE STATEMENT
39 y/o female history of presents to the ED from home c/o pill stuck in throat. Patient states that she took her doxycycline yesterday (she takes it for her acne) and had laid down around 9 PM. Patient states that since she took the pill she felt it in her throat and has been unable to tolerate PO. Denies vomiting, but states she spit up her fruit. Patient states she took her next dose this morning around 4 AM.  Denies fever, chills, n/v, weakness, abd pain, diarrhea/constipation, numbness/tingling, urinary s/s. Patient A&Ox3, in no respiratory distress, and denies chest pain. No swelling to throat. Patient ambulated into the ED. Strong peripheral pulses.

## 2020-08-23 NOTE — ED PROVIDER NOTE - PROGRESS NOTE DETAILS
pt signed out to me pending ENT eval/scope. ENT LYDIA Echavarria reports scope WNL, no abnormal findings, recommending PPI and soft diet. - Natalie Silva PA-C Flex GEORGE: Pt assessed at beside. Pt resting comfortably, pain controlled, pt questions answered. Vital signs stable. Offered cxr however pt declines feels as if sensation is stuck in upper esophagus, discussed plan with pt, phonating normally, handling secretions, no distress, will po chal if able to tolerate will dc w gi/ent f/u understands and agrees w plan.

## 2020-08-23 NOTE — ED PROVIDER NOTE - NS ED ROS FT
Constitutional: No fever or chills  Eyes: No visual changes, eye pain or redness  HEENT: +throat pain. No ear pain, nasal pain. No nose bleeding.  CV: No chest pain or lower extremity edema  Resp: No SOB no cough  GI: No abd pain. No nausea or vomiting. No diarrhea. No constipation.   : No dysuria, hematuria.   MSK: No musculoskeletal pain  Skin: No rash  Neuro: No headache. No numbness or tingling. No weakness.

## 2020-08-23 NOTE — ED PROVIDER NOTE - NSFOLLOWUPINSTRUCTIONS_ED_ALL_ED_FT
Stay well hydrated. Eat a bland, soft, diet.   Avoid fatty/fried/spicy/acidic foods.   Avoid alcohol and caffeine intake.    Sleep with your head slightly elevated.    Please take Protonix (pantoprazole) 40 mg once daily.  Take over the counter Maalox as needed.     Follow up with your primary care provider upon discharge.    Follow up with Gastroenterology this week for further evaluation and outpatient endoscopy. Bring copy of your printed results with you.     Return to ER for fever, new/worsening abdominal pain or throat pain, trouble breathing or swallowing, vomiting, persistent or bloody diarrhea, inability to tolerate food/fluid, or any other concerning symptoms.

## 2020-08-23 NOTE — ED PROVIDER NOTE - PATIENT PORTAL LINK FT
You can access the FollowMyHealth Patient Portal offered by Health system by registering at the following website: [email or web address]. By joining [employer]’s FollowMyHealth portal, you will also be able to view your health information using other applications (apps) compatible with our system.

## 2020-09-17 ENCOUNTER — APPOINTMENT (OUTPATIENT)
Dept: OBGYN | Facility: CLINIC | Age: 40
End: 2020-09-17

## 2020-11-23 NOTE — ED PROVIDER NOTE - DOMESTIC TRAVEL HIGH RISK QUESTION
How Severe Are Your Spot(S)?: moderate Have Your Spot(S) Been Treated In The Past?: has not been treated Hpi Title: Evaluation of Skin Lesions No

## 2020-12-01 ENCOUNTER — APPOINTMENT (OUTPATIENT)
Dept: OBGYN | Facility: CLINIC | Age: 40
End: 2020-12-01
Payer: COMMERCIAL

## 2020-12-01 ENCOUNTER — LABORATORY RESULT (OUTPATIENT)
Age: 40
End: 2020-12-01

## 2020-12-01 VITALS
HEIGHT: 65.5 IN | BODY MASS INDEX: 20.08 KG/M2 | TEMPERATURE: 97.1 F | WEIGHT: 122 LBS | DIASTOLIC BLOOD PRESSURE: 74 MMHG | SYSTOLIC BLOOD PRESSURE: 100 MMHG

## 2020-12-01 DIAGNOSIS — Z01.419 ENCOUNTER FOR GYNECOLOGICAL EXAMINATION (GENERAL) (ROUTINE) W/OUT ABNORMAL FINDINGS: ICD-10-CM

## 2020-12-01 PROCEDURE — 99395 PREV VISIT EST AGE 18-39: CPT

## 2020-12-01 PROCEDURE — 99072 ADDL SUPL MATRL&STAF TM PHE: CPT

## 2020-12-01 RX ORDER — FLUTICASONE PROPIONATE 50 UG/1
50 SPRAY, METERED NASAL DAILY
Qty: 1 | Refills: 2 | Status: COMPLETED | COMMUNITY
Start: 2019-06-21 | End: 2020-12-01

## 2020-12-01 RX ORDER — ISOTRETINOIN 40 MG/1
40 CAPSULE ORAL
Refills: 0 | Status: COMPLETED | COMMUNITY
Start: 2019-03-05 | End: 2020-12-01

## 2020-12-01 RX ORDER — NORETHINDRONE ACETATE AND ETHINYL ESTRADIOL 1; .02 MG/1; MG/1
1-20 TABLET ORAL DAILY
Qty: 1 | Refills: 11 | Status: COMPLETED | COMMUNITY
Start: 2019-03-05 | End: 2020-12-01

## 2020-12-01 RX ORDER — AMOXICILLIN AND CLAVULANATE POTASSIUM 875; 125 MG/1; MG/1
875-125 TABLET, COATED ORAL
Qty: 20 | Refills: 0 | Status: COMPLETED | COMMUNITY
Start: 2019-06-21 | End: 2020-12-01
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
## 2020-12-01 NOTE — COUNSELING
[Nutrition/ Exercise/ Weight Management] : nutrition, exercise, weight management [Body Image] : body image [Vitamins/Supplements] : vitamins/supplements [Smoking Cessation] : smoking cessation [Breast Self Exam] : breast self exam [Contraception/ Emergency Contraception/ Safe Sexual Practices] : contraception, emergency contraception, safe sexual practices [Preconception Care/ Fertility options] : preconception care, fertility options

## 2020-12-01 NOTE — DISCUSSION/SUMMARY
[FreeTextEntry1] : 41yo P3\par well woman\par \par referred  to therapist/social work for depression

## 2020-12-07 LAB — CYTOLOGY CVX/VAG DOC THIN PREP: ABNORMAL

## 2020-12-21 PROBLEM — Z87.09 HISTORY OF ACUTE SINUSITIS: Status: RESOLVED | Noted: 2019-06-21 | Resolved: 2020-12-21

## 2021-01-13 ENCOUNTER — APPOINTMENT (OUTPATIENT)
Dept: OBGYN | Facility: CLINIC | Age: 41
End: 2021-01-13

## 2021-01-24 DIAGNOSIS — J06.9 ACUTE UPPER RESPIRATORY INFECTION, UNSPECIFIED: ICD-10-CM

## 2021-01-29 ENCOUNTER — TRANSCRIPTION ENCOUNTER (OUTPATIENT)
Age: 41
End: 2021-01-29

## 2021-02-22 ENCOUNTER — APPOINTMENT (OUTPATIENT)
Dept: OBGYN | Facility: CLINIC | Age: 41
End: 2021-02-22

## 2021-03-02 LAB — SARS-COV-2 N GENE NPH QL NAA+PROBE: NOT DETECTED

## 2021-04-29 ENCOUNTER — APPOINTMENT (OUTPATIENT)
Dept: OBGYN | Facility: CLINIC | Age: 41
End: 2021-04-29
Payer: COMMERCIAL

## 2021-04-29 VITALS
WEIGHT: 122 LBS | HEIGHT: 65.5 IN | DIASTOLIC BLOOD PRESSURE: 70 MMHG | BODY MASS INDEX: 20.08 KG/M2 | SYSTOLIC BLOOD PRESSURE: 100 MMHG

## 2021-04-29 PROCEDURE — 57454 BX/CURETT OF CERVIX W/SCOPE: CPT

## 2021-04-29 PROCEDURE — 99072 ADDL SUPL MATRL&STAF TM PHE: CPT

## 2021-04-29 PROCEDURE — 81025 URINE PREGNANCY TEST: CPT

## 2021-04-29 RX ORDER — AZITHROMYCIN 250 MG/1
250 TABLET, FILM COATED ORAL
Qty: 1 | Refills: 0 | Status: COMPLETED | COMMUNITY
Start: 2021-01-24 | End: 2021-04-29

## 2021-04-29 NOTE — PROCEDURE
[Colposcopy] : Colposcopy  [Time out performed] : Pre-procedure time out performed.  Patient's name, date of birth and procedure confirmed. [Consent Obtained] : Consent obtained [Risks] : risks [Benefits] : benefits [Alternatives] : alternatives [Patient] : patient [Infection] : infection [Bleeding] : bleeding [HPV High Risk] : HPV high risk [No Premedication] : no premedication [Colposcopy Adequate] : colposcopy adequate [Pap Performed] : pap not performed [SCI Fully Visualized] : SCI fully visualized [ECC Performed] : ECC performed [No Abnormalities] : no abnormalities [Biopsy] : biopsy taken [Hemostasis Obtained] : Hemostasis obtained [Tolerated Well] : the patient tolerated the procedure well [de-identified] : 1 [de-identified] : JOSHUA extends out @ 8 o'clock [de-identified] : 8o'clock [de-identified] : monsels and pressure

## 2021-05-05 LAB
CORE LAB BIOPSY: NORMAL
HPV HIGH+LOW RISK DNA PNL CVX: DETECTED

## 2021-05-06 ENCOUNTER — NON-APPOINTMENT (OUTPATIENT)
Age: 41
End: 2021-05-06

## 2021-05-26 DIAGNOSIS — R87.610 ATYPICAL SQUAMOUS CELLS OF UNDETERMINED SIGNIFICANCE ON CYTOLOGIC SMEAR OF CERVIX (ASC-US): ICD-10-CM

## 2021-05-26 DIAGNOSIS — T30.0 BURN OF UNSPECIFIED BODY REGION, UNSPECIFIED DEGREE: ICD-10-CM

## 2021-06-04 LAB
25(OH)D3 SERPL-MCNC: 49.2 NG/ML
ALBUMIN SERPL ELPH-MCNC: 4.5 G/DL
ALP BLD-CCNC: 49 U/L
ALT SERPL-CCNC: 12 U/L
ANION GAP SERPL CALC-SCNC: 12 MMOL/L
APPEARANCE: CLEAR
AST SERPL-CCNC: 32 U/L
BILIRUB SERPL-MCNC: 0.3 MG/DL
BILIRUBIN URINE: NEGATIVE
BLOOD URINE: NEGATIVE
BUN SERPL-MCNC: 16 MG/DL
CALCIUM SERPL-MCNC: 9.8 MG/DL
CHLORIDE SERPL-SCNC: 105 MMOL/L
CHOLEST SERPL-MCNC: 151 MG/DL
CO2 SERPL-SCNC: 22 MMOL/L
COLOR: YELLOW
CREAT SERPL-MCNC: 0.68 MG/DL
CRP SERPL HS-MCNC: 0.5 MG/L
ESTIMATED AVERAGE GLUCOSE: 108 MG/DL
GLUCOSE BS SERPL-MCNC: 75 MG/DL
GLUCOSE QUALITATIVE U: NEGATIVE
GLUCOSE SERPL-MCNC: 86 MG/DL
HBA1C MFR BLD HPLC: 5.4 %
HDLC SERPL-MCNC: 51 MG/DL
KETONES URINE: NEGATIVE
LDLC SERPL CALC-MCNC: 77 MG/DL
LEUKOCYTE ESTERASE URINE: NEGATIVE
NITRITE URINE: NEGATIVE
NONHDLC SERPL-MCNC: 100 MG/DL
PH URINE: 6.5
POTASSIUM SERPL-SCNC: 5 MMOL/L
PROT SERPL-MCNC: 7.3 G/DL
PROTEIN URINE: NORMAL
SODIUM SERPL-SCNC: 140 MMOL/L
SPECIFIC GRAVITY URINE: 1.02
T4 FREE SERPL-MCNC: 1 NG/DL
TRIGL SERPL-MCNC: 113 MG/DL
TSH SERPL-ACNC: 1.37 UIU/ML
UROBILINOGEN URINE: NORMAL
VIT B12 SERPL-MCNC: 1017 PG/ML

## 2021-06-08 ENCOUNTER — APPOINTMENT (OUTPATIENT)
Dept: INTERNAL MEDICINE | Facility: CLINIC | Age: 41
End: 2021-06-08

## 2021-07-09 ENCOUNTER — APPOINTMENT (OUTPATIENT)
Dept: INTERNAL MEDICINE | Facility: CLINIC | Age: 41
End: 2021-07-09

## 2021-07-09 ENCOUNTER — APPOINTMENT (OUTPATIENT)
Dept: INTERNAL MEDICINE | Facility: CLINIC | Age: 41
End: 2021-07-09
Payer: COMMERCIAL

## 2021-07-09 ENCOUNTER — NON-APPOINTMENT (OUTPATIENT)
Age: 41
End: 2021-07-09

## 2021-07-09 DIAGNOSIS — R87.810 CERVICAL HIGH RISK HUMAN PAPILLOMAVIRUS (HPV) DNA TEST POSITIVE: ICD-10-CM

## 2021-07-09 PROCEDURE — 99443: CPT

## 2021-07-12 PROBLEM — R87.810 PAP SMEAR OF CERVIX SHOWS HIGH RISK HPV PRESENT: Status: ACTIVE | Noted: 2021-04-29
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
## 2022-04-12 ENCOUNTER — TRANSCRIPTION ENCOUNTER (OUTPATIENT)
Age: 42
End: 2022-04-12

## 2022-05-26 DIAGNOSIS — R53.83 OTHER FATIGUE: ICD-10-CM

## 2022-06-02 ENCOUNTER — APPOINTMENT (OUTPATIENT)
Dept: INTERNAL MEDICINE | Facility: CLINIC | Age: 42
End: 2022-06-02

## 2022-06-09 ENCOUNTER — APPOINTMENT (OUTPATIENT)
Dept: INTERNAL MEDICINE | Facility: CLINIC | Age: 42
End: 2022-06-09

## 2022-06-09 RX ORDER — FLUCONAZOLE 150 MG/1
150 TABLET ORAL
Qty: 2 | Refills: 0 | Status: DISCONTINUED | COMMUNITY
Start: 2021-04-29 | End: 2022-06-09

## 2022-06-09 RX ORDER — MUPIROCIN 20 MG/G
2 OINTMENT TOPICAL 3 TIMES DAILY
Qty: 1 | Refills: 1 | Status: DISCONTINUED | COMMUNITY
Start: 2021-05-04 | End: 2022-06-09

## 2022-07-30 ENCOUNTER — LABORATORY RESULT (OUTPATIENT)
Age: 42
End: 2022-07-30

## 2022-08-05 ENCOUNTER — APPOINTMENT (OUTPATIENT)
Dept: INTERNAL MEDICINE | Facility: CLINIC | Age: 42
End: 2022-08-05

## 2022-08-05 VITALS
HEIGHT: 65 IN | BODY MASS INDEX: 19.99 KG/M2 | WEIGHT: 120 LBS | TEMPERATURE: 98 F | HEART RATE: 54 BPM | RESPIRATION RATE: 16 BRPM | SYSTOLIC BLOOD PRESSURE: 102 MMHG | DIASTOLIC BLOOD PRESSURE: 66 MMHG | OXYGEN SATURATION: 100 %

## 2022-08-05 DIAGNOSIS — U07.1 COVID-19: ICD-10-CM

## 2022-08-05 DIAGNOSIS — Z00.00 ENCOUNTER FOR GENERAL ADULT MEDICAL EXAMINATION W/OUT ABNORMAL FINDINGS: ICD-10-CM

## 2022-08-05 DIAGNOSIS — Z78.9 OTHER SPECIFIED HEALTH STATUS: ICD-10-CM

## 2022-08-05 DIAGNOSIS — F32.A ANXIETY DISORDER, UNSPECIFIED: ICD-10-CM

## 2022-08-05 DIAGNOSIS — F41.9 ANXIETY DISORDER, UNSPECIFIED: ICD-10-CM

## 2022-08-05 LAB
25(OH)D3 SERPL-MCNC: 47.9 NG/ML
ALBUMIN SERPL ELPH-MCNC: 4.8 G/DL
ALP BLD-CCNC: 54 U/L
ALT SERPL-CCNC: 16 U/L
ANION GAP SERPL CALC-SCNC: 14 MMOL/L
APPEARANCE: ABNORMAL
AST SERPL-CCNC: 31 U/L
BASOPHILS # BLD AUTO: 0.08 K/UL
BASOPHILS NFR BLD AUTO: 1.6 %
BILIRUB SERPL-MCNC: 0.6 MG/DL
BILIRUBIN URINE: NEGATIVE
BLOOD URINE: NEGATIVE
BUN SERPL-MCNC: 15 MG/DL
CALCIUM SERPL-MCNC: 10.1 MG/DL
CHLORIDE SERPL-SCNC: 101 MMOL/L
CHOLEST SERPL-MCNC: 157 MG/DL
CO2 SERPL-SCNC: 28 MMOL/L
COLOR: YELLOW
COVID-19 NUCLEOCAPSID  GAM ANTIBODY INTERPRETATION: POSITIVE
COVID-19 SPIKE DOMAIN ANTIBODY INTERPRETATION: POSITIVE
CREAT SERPL-MCNC: 0.85 MG/DL
CRP SERPL HS-MCNC: 0.56 MG/L
EGFR: 88 ML/MIN/1.73M2
EOSINOPHIL # BLD AUTO: 0.41 K/UL
EOSINOPHIL NFR BLD AUTO: 8.3 %
ESTIMATED AVERAGE GLUCOSE: 103 MG/DL
GLUCOSE BS SERPL-MCNC: 81 MG/DL
GLUCOSE QUALITATIVE U: NEGATIVE
GLUCOSE SERPL-MCNC: 78 MG/DL
HBA1C MFR BLD HPLC: 5.2 %
HCT VFR BLD CALC: 39.3 %
HDLC SERPL-MCNC: 66 MG/DL
HGB BLD-MCNC: 12.8 G/DL
IMM GRANULOCYTES NFR BLD AUTO: 0.2 %
KETONES URINE: NEGATIVE
LDLC SERPL CALC-MCNC: 81 MG/DL
LEUKOCYTE ESTERASE URINE: NEGATIVE
LYMPHOCYTES # BLD AUTO: 2.25 K/UL
LYMPHOCYTES NFR BLD AUTO: 45.4 %
MAN DIFF?: NORMAL
MCHC RBC-ENTMCNC: 31.2 PG
MCHC RBC-ENTMCNC: 32.6 GM/DL
MCV RBC AUTO: 95.9 FL
MONOCYTES # BLD AUTO: 0.3 K/UL
MONOCYTES NFR BLD AUTO: 6 %
NEUTROPHILS # BLD AUTO: 1.91 K/UL
NEUTROPHILS NFR BLD AUTO: 38.5 %
NITRITE URINE: NEGATIVE
NONHDLC SERPL-MCNC: 92 MG/DL
PH URINE: 9
PLATELET # BLD AUTO: 238 K/UL
POTASSIUM SERPL-SCNC: 4.8 MMOL/L
PROT SERPL-MCNC: 7.3 G/DL
PROTEIN URINE: ABNORMAL
RBC # BLD: 4.1 M/UL
RBC # FLD: 14.1 %
SARS-COV-2 AB SERPL IA-ACNC: >250 U/ML
SARS-COV-2 AB SERPL QL IA: 132 INDEX
SODIUM SERPL-SCNC: 142 MMOL/L
SPECIFIC GRAVITY URINE: 1.02
T4 FREE SERPL-MCNC: 1.2 NG/DL
TRIGL SERPL-MCNC: 55 MG/DL
TSH SERPL-ACNC: 1.23 UIU/ML
UROBILINOGEN URINE: NORMAL
VIT B12 SERPL-MCNC: 1294 PG/ML
WBC # FLD AUTO: 4.96 K/UL

## 2022-08-05 PROCEDURE — 99204 OFFICE O/P NEW MOD 45 MIN: CPT | Mod: 25

## 2022-08-05 RX ORDER — PAROXETINE HYDROCHLORIDE 10 MG/1
10 TABLET, FILM COATED ORAL DAILY
Qty: 30 | Refills: 5 | Status: DISCONTINUED | COMMUNITY
Start: 2021-07-08 | End: 2022-08-05

## 2022-08-13 NOTE — HEALTH RISK ASSESSMENT
[HIV test declined] : HIV test declined [Hepatitis C test declined] : Hepatitis C test declined [Never] : Never [Yes] : Yes [No falls in past year] : Patient reported no falls in the past year [0] : 2) Feeling down, depressed, or hopeless: Not at all (0) [PHQ-2 Negative - No further assessment needed] : PHQ-2 Negative - No further assessment needed [de-identified] : Social [[id number]] : 0

## 2022-08-13 NOTE — ASSESSMENT
[FreeTextEntry1] : Physical examination shows a well-developed woman in no acute distress blood pressure 102/66 height 5 foot 5 inches weight 120 pounds BMI 19.97 temperature of 98 °F heart rate of 54 respirations 16 oxygen saturation on room air is 100% HEENT was unremarkable chest was clear cardiovascular exam was regular with no extra heart sounds or murmurs abdomen was soft and nontender extremities showed no clubbing cyanosis or edema neurologic exam was nonfocal patient recently had complete blood tests which were significant for hemoglobin A1c of 5.2% cholesterol of 157 HDL 66 LDL 81 triglycerides 55 patient with nucleocapsid COVID-19 antibodies of 132 confirming her previous COVID-19 infection vitamin D levels of 47.9 vitamin B12 of 1294 patient follows with her gynecologist we will forward be her immunization status for COVID-19 Patient will inform me if her anxiety or depression worsens counseling was suggested she was given a slip for a mammogram and breast ultrasound

## 2022-08-13 NOTE — HISTORY OF PRESENT ILLNESS
[FreeTextEntry1] : 42-year-old woman comes to the office for a comprehensive physical examination to review her medications and discuss her overall health recent issues with some anxiety she also wishes to review recently performed blood test [de-identified] : Comes to the office for a comprehensive physical examination with a history of COVID-19 infection and anxiety and depression patient's review of systems is noncontributory she has had some anxiety brought on by some family problems

## 2022-08-13 NOTE — REVIEW OF SYSTEMS
[Fever] : no fever [Chills] : no chills [Fatigue] : no fatigue [Hot Flashes] : no hot flashes [Night Sweats] : no night sweats [Recent Change In Weight] : ~T no recent weight change [Discharge] : no discharge [Pain] : no pain [Redness] : no redness [Dryness] : no dryness  [Vision Problems] : no vision problems [Itching] : no itching [Earache] : no earache [Hearing Loss] : no hearing loss [Nosebleed] : no nosebleeds [Hoarseness] : no hoarseness [Nasal Discharge] : no nasal discharge [Sore Throat] : no sore throat [Postnasal Drip] : no postnasal drip [Chest Pain] : no chest pain [Palpitations] : no palpitations [Leg Claudication] : no leg claudication [Lower Ext Edema] : no lower extremity edema [Orthopnea] : no orthopnea [Paroxysmal Nocturnal Dyspnea] : no paroxysmal nocturnal dyspnea [Shortness Of Breath] : no shortness of breath [Wheezing] : no wheezing [Cough] : no cough [Dyspnea on Exertion] : no dyspnea on exertion [Abdominal Pain] : no abdominal pain [Nausea] : no nausea [Constipation] : no constipation [Diarrhea] : diarrhea [Vomiting] : no vomiting [Heartburn] : no heartburn [Melena] : no melena [Dysuria] : no dysuria [Incontinence] : no incontinence [Nocturia] : no nocturia [Poor Libido] : libido not poor [Hematuria] : no hematuria [Frequency] : no frequency [Vaginal Discharge] : no vaginal discharge [Dysmenorrhea] : no dysmenorrhea [Joint Pain] : no joint pain [Joint Stiffness] : no joint stiffness [Joint Swelling] : no joint swelling [Muscle Weakness] : no muscle weakness [Muscle Pain] : no muscle pain [Back Pain] : no back pain [Mole Changes] : no mole changes [Nail Changes] : no nail changes [Hair Changes] : no hair changes [Skin Rash] : no skin rash [Headache] : no headache [Dizziness] : no dizziness [Fainting] : no fainting [Confusion] : no confusion [Memory Loss] : no memory loss [Unsteady Walking] : no ataxia [Suicidal] : not suicidal [Insomnia] : no insomnia [Anxiety] : anxiety [Depression] : no depression [Easy Bleeding] : no easy bleeding [Easy Bruising] : no easy bruising [Swollen Glands] : no swollen glands

## 2022-09-21 ENCOUNTER — RESULT REVIEW (OUTPATIENT)
Age: 42
End: 2022-09-21

## 2022-09-21 ENCOUNTER — OUTPATIENT (OUTPATIENT)
Dept: OUTPATIENT SERVICES | Facility: HOSPITAL | Age: 42
LOS: 1 days | End: 2022-09-21
Payer: COMMERCIAL

## 2022-09-21 ENCOUNTER — APPOINTMENT (OUTPATIENT)
Dept: MAMMOGRAPHY | Facility: HOSPITAL | Age: 42
End: 2022-09-21

## 2022-09-21 ENCOUNTER — APPOINTMENT (OUTPATIENT)
Dept: ULTRASOUND IMAGING | Facility: HOSPITAL | Age: 42
End: 2022-09-21

## 2022-09-21 DIAGNOSIS — Z98.891 HISTORY OF UTERINE SCAR FROM PREVIOUS SURGERY: ICD-10-CM

## 2022-09-21 PROCEDURE — 77063 BREAST TOMOSYNTHESIS BI: CPT

## 2022-09-21 PROCEDURE — 77063 BREAST TOMOSYNTHESIS BI: CPT | Mod: 26

## 2022-09-21 PROCEDURE — 77067 SCR MAMMO BI INCL CAD: CPT | Mod: 26

## 2022-09-21 PROCEDURE — 76641 ULTRASOUND BREAST COMPLETE: CPT | Mod: 26,50

## 2022-09-21 PROCEDURE — 77067 SCR MAMMO BI INCL CAD: CPT

## 2022-09-21 PROCEDURE — 76641 ULTRASOUND BREAST COMPLETE: CPT

## 2022-10-04 ENCOUNTER — APPOINTMENT (OUTPATIENT)
Dept: OBGYN | Facility: CLINIC | Age: 42
End: 2022-10-04

## 2022-10-07 ENCOUNTER — APPOINTMENT (OUTPATIENT)
Dept: OBGYN | Facility: CLINIC | Age: 42
End: 2022-10-07

## 2022-11-29 ENCOUNTER — APPOINTMENT (OUTPATIENT)
Dept: OBGYN | Facility: CLINIC | Age: 42
End: 2022-11-29

## 2023-02-07 ENCOUNTER — APPOINTMENT (OUTPATIENT)
Dept: RADIOLOGY | Facility: HOSPITAL | Age: 43
End: 2023-02-07
Payer: COMMERCIAL

## 2023-02-07 ENCOUNTER — OUTPATIENT (OUTPATIENT)
Dept: OUTPATIENT SERVICES | Facility: HOSPITAL | Age: 43
LOS: 1 days | End: 2023-02-07
Payer: COMMERCIAL

## 2023-02-07 DIAGNOSIS — Z00.8 ENCOUNTER FOR OTHER GENERAL EXAMINATION: ICD-10-CM

## 2023-02-07 PROCEDURE — 72110 X-RAY EXAM L-2 SPINE 4/>VWS: CPT | Mod: 26

## 2023-02-07 PROCEDURE — 72110 X-RAY EXAM L-2 SPINE 4/>VWS: CPT

## 2023-02-08 DIAGNOSIS — M54.50 LOW BACK PAIN, UNSPECIFIED: ICD-10-CM

## 2023-02-13 ENCOUNTER — TRANSCRIPTION ENCOUNTER (OUTPATIENT)
Age: 43
End: 2023-02-13

## 2023-02-13 ENCOUNTER — NON-APPOINTMENT (OUTPATIENT)
Age: 43
End: 2023-02-13

## 2023-02-13 ENCOUNTER — APPOINTMENT (OUTPATIENT)
Dept: ORTHOPEDIC SURGERY | Facility: CLINIC | Age: 43
End: 2023-02-13
Payer: COMMERCIAL

## 2023-02-13 VITALS
HEART RATE: 48 BPM | SYSTOLIC BLOOD PRESSURE: 107 MMHG | BODY MASS INDEX: 20.33 KG/M2 | HEIGHT: 65 IN | DIASTOLIC BLOOD PRESSURE: 71 MMHG | WEIGHT: 122 LBS

## 2023-02-13 DIAGNOSIS — M54.16 RADICULOPATHY, LUMBAR REGION: ICD-10-CM

## 2023-02-13 DIAGNOSIS — M51.26 OTHER INTERVERTEBRAL DISC DISPLACEMENT, LUMBAR REGION: ICD-10-CM

## 2023-02-13 PROCEDURE — 99204 OFFICE O/P NEW MOD 45 MIN: CPT

## 2023-02-13 RX ORDER — CELECOXIB 100 MG/1
100 CAPSULE ORAL TWICE DAILY
Qty: 30 | Refills: 0 | Status: ACTIVE | COMMUNITY
Start: 2023-02-13 | End: 1900-01-01

## 2023-02-13 NOTE — CONSULT LETTER
[Dear  ___] : Dear  [unfilled], [Consult Letter:] : I had the pleasure of evaluating your patient, [unfilled]. [FreeTextEntry2] : Dudley Rivera yes [FreeTextEntry1] : Thank you for this referral. I have enclosed my note for your review. Please feel free to contact my office if you have additional questions regarding this patient.\par \par Regards,\par Eusebio Carreon MD, FACS, FAAOS\par \par  of Orthopaedic Surgery\par [employer] [facility] [employer]\par Spinal Reconstruction Surgery\par Minimally Invasive Spinal Surgery\par [facility]

## 2023-02-13 NOTE — PHYSICAL EXAM
[Antalgic] : antalgic [Stooped] : stooped [] : Motor: [NL] : Motor strength of the left lower extremity was normal [___/5] : right ([unfilled]/5) [Motor Strength Lower Extremities] : right (5/5) [LE] : Sensory: Intact in bilateral lower extremities [1+] : left patella 1+ [2+] : left ankle jerk 2+ [SLR] : negative straight leg raise [Plantar Reflex Right Only] : absent on the right [Plantar Reflex Left Only] : absent on the left [DTR Reflexes Clonus Of Right Ankle (___ Beats)] : absent on the right [DTR Reflexes Clonus Of Left Ankle (___ Beats)] : absent on the left [DP] : dorsalis pedis 2+ and symmetric bilaterally [de-identified] : leans to the left\par The pt is awake, alert and oriented to self, place and time, is comfortable and in no acute distress. Inspection of neck, back and lower extremities bilaterally reveals no rashes or ecchymotic lesions.  There is no obvious abnormal spinal curvature in the sagittal and coronal planes. There is no tenderness over the cervical, thoracic or lumbar spine, or the paraspinal or upper and lower extremities musculature. There is no sacroiliac tenderness. No greater trochanteric tenderness bilaterally. No atrophy or abnormal movements noted in the upper or lower extremities. There is no swelling noted in the upper or lower extremities bilaterally. No cervical lymphadenopathy noted anteriorly. No joint laxity noted in the upper and lower extremity joints bilaterally.\par Hip range of motion is degrees internal rotation 30° external rotation without pain. Full range of motion of the shoulders bilaterally with no significant pain\par There is no groin pain with hip internal rotation with some discomfort with right hip external rotation and a negative RADHA test bilaterally.  [de-identified] : flex 10 degrees with right buttock pain, ext 20 degrees less pain\par leans to the left  [de-identified] : ACC: [id number] EXAM: XR LS SPINE W OBLIQUES MIN 4V ORDERED BY: TATI SALAS\par \par PROCEDURE DATE: 02/07/2023\par \par INTERPRETATION: XR LUMBAR SPINE OBLIQUES\par \par HISTORY: Pain for 2 weeks. Right groin and buttock pain.\par \par VIEWS: 4 including oblique views IMAGES: 4\par \par COMPARISON: Lumbar spine MRI dated 5/11/2015.\par \par FINDINGS:\par \par OSSEOUS STRUCTURES\par Fractures: None. No pars defects are appreciated on the oblique views.\par \par ALIGNMENT: There is mild lower lumbar levocurvature.\par \par DISC SPACES: There is mild loss of disc height at L5-S1.\par \par VISUALIZED PELVIC JOINTS: Hip and sacroiliac joints appear preserved.\par \par SOFT TISSUES: Unremarkable.\par \par IMPRESSION:\par 1. Mild L5-S1 degenerative disc disease.\par \par --- End of Report ---\par \par RETA FISH MD; Attending Radiologist\par This document has been electronically signed. Feb 7 2023 1:45PM

## 2023-02-13 NOTE — DISCUSSION/SUMMARY
[Medication Risks Reviewed] : Medication risks reviewed [de-identified] : X-rays of the lumbar spine performed previously demonstrated loss of disc height at L5-S1.  I am suspicious for an acute disc herniation given the clinical presentation with right buttock and groin pain with no significant hip irritability suggestive of an L5-S1 radiculopathy.  She also has weakness of her EHL on the right side and inability to flex more than 10 degrees across the lumbar spine due to right buttock and groin pain.  She is able to extend 20 to 30 degrees with much less discomfort.  She is completing a Medrol Dosepak today and I prescribed her Celebrex to start tonight.  Prescribed her gabapentin 100 mg nightly which can be titrated up to 300 mg nightly.  Further treatment option can be discussed at the MRI may include lumbar epidural steroid injections or surgical decompression as appropriate.\par \par Recommend use of a lumbar corset for use when out of bed.  \par \par The patient was educated regarding their condition, treatment options as well as prescribed course of treatment. \par Risks and benefits as well as alternatives to the proposed treatment were also provided to the patient \par They were given the opportunity to have all their questions answered to their satisfaction.\par \par Vital signs were reviewed with the patient and the patient was instructed to followup with their primary care provider for further management. There were no PAs or scribes used in the evaluation, exam or treatment plan discussion. The surgeon was the primary evaluating or treating physician as noted above.

## 2023-02-13 NOTE — HISTORY OF PRESENT ILLNESS
[6] : a current pain level of 6/10 [Daily] : ~He/She~ states the symptoms seem to be occuring daily [Bending] : worsened by bending [Lifting] : worsened by lifting [Prolonged Sitting] : worsened by prolonged sitting [Prolonged Standing] : worsened by prolonged standing [de-identified] : Patient is here today for evaluation on her low back right buttock/groin leg pain going on 3 weeks ago fell on her tailbone, after skiing not medically treated until recently saw PCP had xrays 2/7/2023 given medrol dose pack some relief. Patient also saw chiropractor no real relief.\par Runs track, works out 2 hours a day\par 2/3/23 tried squatting with severe low back pain\par 2/4/23 saw a chiropractor, pain increased right hip and buttocks.\par saw PCP. 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

## 2023-03-13 RX ORDER — GABAPENTIN 100 MG/1
100 CAPSULE ORAL
Qty: 30 | Refills: 2 | Status: COMPLETED | COMMUNITY
Start: 2023-02-13 | End: 2023-03-13

## 2023-03-15 ENCOUNTER — NON-APPOINTMENT (OUTPATIENT)
Age: 43
End: 2023-03-15

## 2023-03-16 ENCOUNTER — NON-APPOINTMENT (OUTPATIENT)
Age: 43
End: 2023-03-16

## 2023-03-16 ENCOUNTER — APPOINTMENT (OUTPATIENT)
Dept: NEUROSURGERY | Facility: CLINIC | Age: 43
End: 2023-03-16
Payer: COMMERCIAL

## 2023-03-16 VITALS
HEART RATE: 51 BPM | SYSTOLIC BLOOD PRESSURE: 126 MMHG | WEIGHT: 122 LBS | OXYGEN SATURATION: 95 % | HEIGHT: 65 IN | TEMPERATURE: 97 F | BODY MASS INDEX: 20.33 KG/M2 | RESPIRATION RATE: 18 BRPM | DIASTOLIC BLOOD PRESSURE: 86 MMHG

## 2023-03-16 DIAGNOSIS — M51.37 OTHER INTERVERTEBRAL DISC DEGENERATION, LUMBOSACRAL REGION: ICD-10-CM

## 2023-03-16 PROCEDURE — 99203 OFFICE O/P NEW LOW 30 MIN: CPT

## 2023-03-16 RX ORDER — METHYLPREDNISOLONE 4 MG/1
4 TABLET ORAL
Qty: 1 | Refills: 1 | Status: COMPLETED | COMMUNITY
Start: 2023-02-08 | End: 2023-03-16

## 2023-03-20 NOTE — ASSESSMENT
[FreeTextEntry1] : PLAN\par \par Reviewed MRi Lumbar spine\par Assessed pt \par Continue PT\par Limit heavy weight\par Would not recommend surgery at this time\par If symptom re occur and progressing call athe office and have a follow up appt\par Pt verbalized uanderstanding\par \par \par \par I, Dr. Mcgregor, personally performed the evaluation and management (E/M) services for this new patient. That E/M includes conducting the initial examination, assessing all conditions, and establishing the plan of care. Today, my ACP, Terri York, was here to observe my evaluation and management services for this patient to be followed going forward.\par \par

## 2023-03-20 NOTE — ADDENDUM
[FreeTextEntry1] : PATIENT:	Drake Bingham 				\par \par Thursday, March 16, 2023\par \par I saw Drake in the office today.  She is a 42-year-old female who presents with new-onset back pain after a ski accident that was subsequently exacerbated by weight lifting.  She reports that she developed new-onset back pain in January, only to develop worsening pain at the gym.  She went on to develop right and then left leg pain which necessitated MRI which was performed on March 6, and she now comes for neurosurgical evaluation.\par \par Ms. Bingham today has no obvious pain.  She is pain free in the office.  She believes she has been this way for at least the past 3-4 days.  She really has no other complaints.  She is otherwise healthy with no other major medical problems.  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  There are no other contributing factors.  Her exam is normal.  There is no evidence of dorsiflexion or plantar flexion weakness.  Her straight leg testing is negative bilaterally.\par \par I did have the opportunity to review her lumbar MRI which shows evidence of a central disc herniation at L5-S1 but with a remarkably normal-appearing lumbar spine.  She does have some lumbar straightening, but other than that, there are really no findings.\par \par I had a long discussion regarding treatment.  I do not see any indication for surgery here.  I would recommend conservative management of a central disc herniation and would expect her symptoms to improve over time.  I would be very reluctant to recommend any intervention especially in a young woman and told her so.  I did recommend some lifestyle modifications in an attempt to encourage her to heal, and I am hopeful that she will pursue that pathway.  I will be seeing her again as necessary.\par \par \par \par Ivan Mcgregor MD\par \par [id number]\par

## 2023-03-20 NOTE — HISTORY OF PRESENT ILLNESS
[6] : a current pain level of 6/10 [Daily] : ~He/She~ states the symptoms seem to be occuring daily [Bending] : worsened by bending [Lifting] : worsened by lifting [Prolonged Sitting] : worsened by prolonged sitting [Prolonged Standing] : worsened by prolonged standing [FreeTextEntry1] : low back pain radiating down left buttock, groin and leg with numbness and tingling at times [de-identified] : 43 yo right handed  female experiencing low back right buttock/groin leg pain going on over a month ago fell on her tailbone, after skiing not medically treated until recently saw PCP had xrays 2/7/2023 given medrol dose pack some relief. Patient also saw chiropractor no real relief.\par Runs track, works out 2 hours a day\par 2/3/23 tried squatting with severe low back pain\par 2/4/23 saw a chiropractor, pain increased right hip and buttocks.\par saw PCP. 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

## 2023-03-20 NOTE — PHYSICAL EXAM
[] : Motor: [NL] : Motor strength of the left lower extremity was normal [___/5] : right ([unfilled]/5) [Motor Strength Lower Extremities] : right (5/5) [LE] : Sensory: Intact in bilateral lower extremities [1+] : left patella 1+ [2+] : left ankle jerk 2+ [DP] : dorsalis pedis 2+ and symmetric bilaterally [General Appearance - Alert] : alert [Oriented To Time, Place, And Person] : oriented to person, place, and time [Motor Tone] : muscle tone was normal in all four extremities [Motor Strength] : muscle strength was normal in all four extremities [No Visual Abnormalities] : no visible abnormailities [Normal] : Gait: normal [SLR] : negative straight leg raise [Plantar Reflex Right Only] : absent on the right [Plantar Reflex Left Only] : absent on the left [DTR Reflexes Clonus Of Right Ankle (___ Beats)] : absent on the right [DTR Reflexes Clonus Of Left Ankle (___ Beats)] : absent on the left [de-identified] : leans to the left\par The pt is awake, alert and oriented to self, place and time, is comfortable and in no acute distress. Inspection of neck, back and lower extremities bilaterally reveals no rashes or ecchymotic lesions.  There is no obvious abnormal spinal curvature in the sagittal and coronal planes. There is no tenderness over the cervical, thoracic or lumbar spine, or the paraspinal or upper and lower extremities musculature. There is no sacroiliac tenderness. No greater trochanteric tenderness bilaterally. No atrophy or abnormal movements noted in the upper or lower extremities. There is no swelling noted in the upper or lower extremities bilaterally. No cervical lymphadenopathy noted anteriorly. No joint laxity noted in the upper and lower extremity joints bilaterally.\par Hip range of motion is degrees internal rotation 30° external rotation without pain. Full range of motion of the shoulders bilaterally with no significant pain\par There is no groin pain with hip internal rotation with some discomfort with right hip external rotation and a negative RADHA test bilaterally.  [de-identified] : ACC: [id number] EXAM: XR LS SPINE W OBLIQUES MIN 4V ORDERED BY: TATI SALAS\par \par PROCEDURE DATE: 02/07/2023\par \par INTERPRETATION: XR LUMBAR SPINE OBLIQUES\par \par HISTORY: Pain for 2 weeks. Right groin and buttock pain.\par \par VIEWS: 4 including oblique views IMAGES: 4\par \par COMPARISON: Lumbar spine MRI dated 5/11/2015.\par \par FINDINGS:\par \par OSSEOUS STRUCTURES\par Fractures: None. No pars defects are appreciated on the oblique views.\par \par ALIGNMENT: There is mild lower lumbar levocurvature.\par \par DISC SPACES: There is mild loss of disc height at L5-S1.\par \par VISUALIZED PELVIC JOINTS: Hip and sacroiliac joints appear preserved.\par \par SOFT TISSUES: Unremarkable.\par \par IMPRESSION:\par 1. Mild L5-S1 degenerative disc disease.\par \par --- End of Report ---\par \par RETA FISH MD; Attending Radiologist\par This document has been electronically signed. Feb 7 2023 1:45PM

## 2023-03-22 ENCOUNTER — NON-APPOINTMENT (OUTPATIENT)
Age: 43
End: 2023-03-22

## 2024-04-09 ENCOUNTER — NON-APPOINTMENT (OUTPATIENT)
Age: 44
End: 2024-04-09

## 2024-05-22 NOTE — DISCHARGE NOTE OB - INCREASED VAGINAL BLEEDING OR LARGE CLOTS (SATURATING A PAD AN HOUR)
- Called patient and informed her of the process we need to go through to discontinue her home INR machine. Patient understood and agreed to follow the requirement of weekly check;s until her machine is discontinued and returned.   - Christine Mendoza MA      ----- Message from Ramya Stahl RN sent at 5/22/2024 10:34 AM EDT -----  Regarding: FW: patient call back    ----- Message -----  From: Katharina Blue  Sent: 5/20/2024   9:44 AM EDT  To: Ramya Stahl RN  Subject: patient call back                                She called in wanting to talk to you about her INR options. She has a machine at her house that she has been using but it has become to costly and she is planning on sending back.       
Statement Selected

## 2025-02-12 ENCOUNTER — NON-APPOINTMENT (OUTPATIENT)
Age: 45
End: 2025-02-12

## 2025-02-13 ENCOUNTER — NON-APPOINTMENT (OUTPATIENT)
Age: 45
End: 2025-02-13

## 2025-02-13 ENCOUNTER — APPOINTMENT (OUTPATIENT)
Dept: OBGYN | Facility: CLINIC | Age: 45
End: 2025-02-13
Payer: COMMERCIAL

## 2025-02-13 VITALS
HEIGHT: 65 IN | BODY MASS INDEX: 19.83 KG/M2 | WEIGHT: 119 LBS | DIASTOLIC BLOOD PRESSURE: 74 MMHG | SYSTOLIC BLOOD PRESSURE: 118 MMHG

## 2025-02-13 DIAGNOSIS — Z01.419 ENCOUNTER FOR GYNECOLOGICAL EXAMINATION (GENERAL) (ROUTINE) W/OUT ABNORMAL FINDINGS: ICD-10-CM

## 2025-02-13 DIAGNOSIS — Z83.3 FAMILY HISTORY OF DIABETES MELLITUS: ICD-10-CM

## 2025-02-13 DIAGNOSIS — R87.810 CERVICAL HIGH RISK HUMAN PAPILLOMAVIRUS (HPV) DNA TEST POSITIVE: ICD-10-CM

## 2025-02-13 DIAGNOSIS — F32.A DEPRESSION, UNSPECIFIED: ICD-10-CM

## 2025-02-13 PROCEDURE — G0444 DEPRESSION SCREEN ANNUAL: CPT | Mod: 59

## 2025-02-13 PROCEDURE — 90471 IMMUNIZATION ADMIN: CPT

## 2025-02-13 PROCEDURE — 99386 PREV VISIT NEW AGE 40-64: CPT | Mod: 25

## 2025-02-13 PROCEDURE — 90651 9VHPV VACCINE 2/3 DOSE IM: CPT

## 2025-02-13 RX ORDER — BUPROPION HYDROCHLORIDE 100 MG/1
100 TABLET, FILM COATED ORAL
Refills: 0 | Status: ACTIVE | COMMUNITY

## 2025-02-14 LAB — HPV HIGH+LOW RISK DNA PNL CVX: NOT DETECTED

## 2025-02-19 LAB — CYTOLOGY CVX/VAG DOC THIN PREP: NORMAL

## 2025-04-09 ENCOUNTER — APPOINTMENT (OUTPATIENT)
Dept: OBGYN | Facility: CLINIC | Age: 45
End: 2025-04-09

## 2025-04-09 VITALS — BODY MASS INDEX: 19.8 KG/M2 | DIASTOLIC BLOOD PRESSURE: 54 MMHG | WEIGHT: 119 LBS | SYSTOLIC BLOOD PRESSURE: 110 MMHG

## 2025-04-09 DIAGNOSIS — Z12.39 ENCOUNTER FOR OTHER SCREENING FOR MALIGNANT NEOPLASM OF BREAST: ICD-10-CM

## 2025-04-09 LAB
HCG UR QL: NEGATIVE
QUALITY CONTROL: YES

## 2025-06-04 ENCOUNTER — APPOINTMENT (OUTPATIENT)
Dept: MAMMOGRAPHY | Facility: HOSPITAL | Age: 45
End: 2025-06-04